# Patient Record
Sex: FEMALE | Race: WHITE | NOT HISPANIC OR LATINO | Employment: OTHER | URBAN - METROPOLITAN AREA
[De-identification: names, ages, dates, MRNs, and addresses within clinical notes are randomized per-mention and may not be internally consistent; named-entity substitution may affect disease eponyms.]

---

## 2023-07-12 LAB
ALANINE AMINOTRANSFERASE (SGPT) (U/L) IN SER/PLAS: 10 U/L (ref 7–45)
ALBUMIN (G/DL) IN SER/PLAS: 4.3 G/DL (ref 3.4–5)
ALKALINE PHOSPHATASE (U/L) IN SER/PLAS: 66 U/L (ref 33–136)
AMPHETAMINE (PRESENCE) IN URINE BY SCREEN METHOD: NORMAL
ANION GAP IN SER/PLAS: 15 MMOL/L (ref 10–20)
ASPARTATE AMINOTRANSFERASE (SGOT) (U/L) IN SER/PLAS: 15 U/L (ref 9–39)
BARBITURATES PRESENCE IN URINE BY SCREEN METHOD: NORMAL
BASOPHILS (10*3/UL) IN BLOOD BY AUTOMATED COUNT: 0.04 X10E9/L (ref 0–0.1)
BASOPHILS/100 LEUKOCYTES IN BLOOD BY AUTOMATED COUNT: 0.5 % (ref 0–2)
BENZODIAZEPINE (PRESENCE) IN URINE BY SCREEN METHOD: NORMAL
BILIRUBIN TOTAL (MG/DL) IN SER/PLAS: 0.3 MG/DL (ref 0–1.2)
CALCIUM (MG/DL) IN SER/PLAS: 9.2 MG/DL (ref 8.6–10.3)
CANNABINOIDS IN URINE BY SCREEN METHOD: NORMAL
CARBON DIOXIDE, TOTAL (MMOL/L) IN SER/PLAS: 26 MMOL/L (ref 21–32)
CHLORIDE (MMOL/L) IN SER/PLAS: 104 MMOL/L (ref 98–107)
COBALAMIN (VITAMIN B12) (PG/ML) IN SER/PLAS: 155 PG/ML (ref 211–911)
COCAINE (PRESENCE) IN URINE BY SCREEN METHOD: NORMAL
CREATININE (MG/DL) IN SER/PLAS: 1.38 MG/DL (ref 0.5–1.05)
DRUG SCREEN COMMENT URINE: NORMAL
EOSINOPHILS (10*3/UL) IN BLOOD BY AUTOMATED COUNT: 0.28 X10E9/L (ref 0–0.4)
EOSINOPHILS/100 LEUKOCYTES IN BLOOD BY AUTOMATED COUNT: 3.4 % (ref 0–6)
ERYTHROCYTE DISTRIBUTION WIDTH (RATIO) BY AUTOMATED COUNT: 17.2 % (ref 11.5–14.5)
ERYTHROCYTE MEAN CORPUSCULAR HEMOGLOBIN CONCENTRATION (G/DL) BY AUTOMATED: 29 G/DL (ref 32–36)
ERYTHROCYTE MEAN CORPUSCULAR VOLUME (FL) BY AUTOMATED COUNT: 81 FL (ref 80–100)
ERYTHROCYTES (10*6/UL) IN BLOOD BY AUTOMATED COUNT: 4.21 X10E12/L (ref 4–5.2)
FENTANYL URINE: NORMAL
FERRITIN (UG/LL) IN SER/PLAS: 11 UG/L (ref 8–150)
GFR FEMALE: 40 ML/MIN/1.73M2
GLUCOSE (MG/DL) IN SER/PLAS: 125 MG/DL (ref 74–99)
HEMATOCRIT (%) IN BLOOD BY AUTOMATED COUNT: 34.1 % (ref 36–46)
HEMOGLOBIN (G/DL) IN BLOOD: 9.9 G/DL (ref 12–16)
IMMATURE GRANULOCYTES/100 LEUKOCYTES IN BLOOD BY AUTOMATED COUNT: 0.6 % (ref 0–0.9)
IRON (UG/DL) IN SER/PLAS: 22 UG/DL (ref 35–150)
IRON BINDING CAPACITY (UG/DL) IN SER/PLAS: 462 UG/DL (ref 240–445)
IRON SATURATION (%) IN SER/PLAS: 5 % (ref 25–45)
LEUKOCYTES (10*3/UL) IN BLOOD BY AUTOMATED COUNT: 8.2 X10E9/L (ref 4.4–11.3)
LYMPHOCYTES (10*3/UL) IN BLOOD BY AUTOMATED COUNT: 1.32 X10E9/L (ref 0.8–3)
LYMPHOCYTES/100 LEUKOCYTES IN BLOOD BY AUTOMATED COUNT: 16.1 % (ref 13–44)
METHADONE (PRESENCE) IN URINE BY SCREEN METHOD: NORMAL
MONOCYTES (10*3/UL) IN BLOOD BY AUTOMATED COUNT: 0.46 X10E9/L (ref 0.05–0.8)
MONOCYTES/100 LEUKOCYTES IN BLOOD BY AUTOMATED COUNT: 5.6 % (ref 2–10)
NEUTROPHILS (10*3/UL) IN BLOOD BY AUTOMATED COUNT: 6.06 X10E9/L (ref 1.6–5.5)
NEUTROPHILS/100 LEUKOCYTES IN BLOOD BY AUTOMATED COUNT: 73.8 % (ref 40–80)
NRBC (PER 100 WBCS) BY AUTOMATED COUNT: 0 /100 WBC (ref 0–0)
OPIATES (PRESENCE) IN URINE BY SCREEN METHOD: NORMAL
OXYCODONE (PRESENCE) IN URINE BY SCREEN METHOD: NORMAL
PHENCYCLIDINE (PRESENCE) IN URINE BY SCREEN METHOD: NORMAL
PLATELETS (10*3/UL) IN BLOOD AUTOMATED COUNT: 378 X10E9/L (ref 150–450)
POTASSIUM (MMOL/L) IN SER/PLAS: 4.7 MMOL/L (ref 3.5–5.3)
PROTEIN TOTAL: 6.9 G/DL (ref 6.4–8.2)
SODIUM (MMOL/L) IN SER/PLAS: 140 MMOL/L (ref 136–145)
THYROTROPIN (MIU/L) IN SER/PLAS BY DETECTION LIMIT <= 0.05 MIU/L: 0.9 MIU/L (ref 0.44–3.98)
UREA NITROGEN (MG/DL) IN SER/PLAS: 14 MG/DL (ref 6–23)

## 2023-07-13 LAB
ESTIMATED AVERAGE GLUCOSE FOR HBA1C: 134 MG/DL
HEMOGLOBIN A1C/HEMOGLOBIN TOTAL IN BLOOD: 6.3 %

## 2023-07-19 ENCOUNTER — APPOINTMENT (OUTPATIENT)
Dept: PRIMARY CARE | Facility: CLINIC | Age: 73
End: 2023-07-19
Payer: MEDICARE

## 2023-08-01 LAB — URINE CULTURE: NORMAL

## 2023-08-24 ENCOUNTER — OFFICE VISIT (OUTPATIENT)
Dept: PRIMARY CARE | Facility: CLINIC | Age: 73
End: 2023-08-24
Payer: MEDICARE

## 2023-08-24 DIAGNOSIS — F11.90 OPIOID USE: ICD-10-CM

## 2023-08-24 DIAGNOSIS — E11.42 TYPE 2 DIABETES MELLITUS WITH POLYNEUROPATHY (MULTI): ICD-10-CM

## 2023-08-24 DIAGNOSIS — K26.9 DUODENAL ULCER: ICD-10-CM

## 2023-08-24 DIAGNOSIS — M17.0 PRIMARY OSTEOARTHRITIS OF BOTH KNEES: Primary | ICD-10-CM

## 2023-08-24 DIAGNOSIS — I10 PRIMARY HYPERTENSION: ICD-10-CM

## 2023-08-24 DIAGNOSIS — F03.A0 MILD DEMENTIA WITHOUT BEHAVIORAL DISTURBANCE, PSYCHOTIC DISTURBANCE, MOOD DISTURBANCE, OR ANXIETY, UNSPECIFIED DEMENTIA TYPE (MULTI): ICD-10-CM

## 2023-08-24 DIAGNOSIS — F41.9 ANXIETY: ICD-10-CM

## 2023-08-24 DIAGNOSIS — D50.0 IRON DEFICIENCY ANEMIA DUE TO CHRONIC BLOOD LOSS: ICD-10-CM

## 2023-08-24 PROBLEM — R51.9 HEADACHE: Status: ACTIVE | Noted: 2023-06-18

## 2023-08-24 PROBLEM — M20.42 HAMMER TOES, BILATERAL: Status: ACTIVE | Noted: 2017-11-27

## 2023-08-24 PROBLEM — K43.2 INCISIONAL HERNIA: Status: ACTIVE | Noted: 2023-08-24

## 2023-08-24 PROBLEM — E11.9 DIABETES MELLITUS (MULTI): Status: ACTIVE | Noted: 2023-06-18

## 2023-08-24 PROBLEM — M25.511 PAIN OF BOTH SHOULDER JOINTS: Status: ACTIVE | Noted: 2022-01-26

## 2023-08-24 PROBLEM — M79.675 PAIN IN TOES OF BOTH FEET: Status: ACTIVE | Noted: 2017-11-27

## 2023-08-24 PROBLEM — B35.1 ONYCHOMYCOSIS: Status: ACTIVE | Noted: 2017-11-27

## 2023-08-24 PROBLEM — D64.9 ANEMIA: Status: ACTIVE | Noted: 2023-06-18

## 2023-08-24 PROBLEM — M79.674 PAIN IN TOES OF BOTH FEET: Status: ACTIVE | Noted: 2017-11-27

## 2023-08-24 PROBLEM — M25.561 CHRONIC PAIN OF BOTH KNEES: Status: ACTIVE | Noted: 2022-01-26

## 2023-08-24 PROBLEM — C02.9 MALIGNANT NEOPLASM OF TONGUE (MULTI): Status: ACTIVE | Noted: 2023-06-18

## 2023-08-24 PROBLEM — E04.1 THYROID NODULE: Status: ACTIVE | Noted: 2023-08-24

## 2023-08-24 PROBLEM — G89.29 CHRONIC PAIN OF BOTH KNEES: Status: ACTIVE | Noted: 2022-01-26

## 2023-08-24 PROBLEM — K14.6 SORENESS OF TONGUE: Status: ACTIVE | Noted: 2023-08-24

## 2023-08-24 PROBLEM — M25.512 PAIN OF BOTH SHOULDER JOINTS: Status: ACTIVE | Noted: 2022-01-26

## 2023-08-24 PROBLEM — H60.02 ABSCESS OF LEFT EXTERNAL EAR: Status: ACTIVE | Noted: 2023-08-24

## 2023-08-24 PROBLEM — R26.89 LOSS OF BALANCE: Status: ACTIVE | Noted: 2023-08-24

## 2023-08-24 PROBLEM — H61.23 IMPACTED CERUMEN OF BOTH EARS: Status: ACTIVE | Noted: 2023-08-24

## 2023-08-24 PROBLEM — M79.672 PAIN IN LEFT FOOT: Status: ACTIVE | Noted: 2017-11-27

## 2023-08-24 PROBLEM — K92.1 MELENA: Status: ACTIVE | Noted: 2023-08-24

## 2023-08-24 PROBLEM — M77.32 CALCANEAL SPUR, LEFT: Status: ACTIVE | Noted: 2017-11-27

## 2023-08-24 PROBLEM — M20.41 HAMMER TOES, BILATERAL: Status: ACTIVE | Noted: 2017-11-27

## 2023-08-24 PROBLEM — M72.2 PLANTAR FASCIITIS: Status: ACTIVE | Noted: 2017-11-27

## 2023-08-24 PROBLEM — H91.90 HEARING DIFFICULTY: Status: ACTIVE | Noted: 2023-06-18

## 2023-08-24 PROBLEM — I89.0 LYMPHEDEMA: Status: ACTIVE | Noted: 2022-12-07

## 2023-08-24 PROBLEM — R47.1 DYSARTHRIA ON EXAMINATION: Status: ACTIVE | Noted: 2023-08-24

## 2023-08-24 PROBLEM — M47.812 CERVICAL SPONDYLOSIS WITHOUT MYELOPATHY: Status: ACTIVE | Noted: 2022-01-26

## 2023-08-24 PROBLEM — M25.562 CHRONIC PAIN OF BOTH KNEES: Status: ACTIVE | Noted: 2022-01-26

## 2023-08-24 PROBLEM — R92.8 ABNORMAL MAMMOGRAM OF LEFT BREAST: Status: ACTIVE | Noted: 2023-08-24

## 2023-08-24 PROCEDURE — 1159F MED LIST DOCD IN RCRD: CPT | Performed by: INTERNAL MEDICINE

## 2023-08-24 PROCEDURE — 99204 OFFICE O/P NEW MOD 45 MIN: CPT | Performed by: INTERNAL MEDICINE

## 2023-08-24 PROCEDURE — 3044F HG A1C LEVEL LT 7.0%: CPT | Performed by: INTERNAL MEDICINE

## 2023-08-24 PROCEDURE — 3008F BODY MASS INDEX DOCD: CPT | Performed by: INTERNAL MEDICINE

## 2023-08-24 PROCEDURE — 1125F AMNT PAIN NOTED PAIN PRSNT: CPT | Performed by: INTERNAL MEDICINE

## 2023-08-24 PROCEDURE — 4010F ACE/ARB THERAPY RXD/TAKEN: CPT | Performed by: INTERNAL MEDICINE

## 2023-08-24 RX ORDER — PANTOPRAZOLE SODIUM 40 MG/1
40 TABLET, DELAYED RELEASE ORAL
COMMUNITY
Start: 2022-11-26 | End: 2023-10-30 | Stop reason: SDUPTHER

## 2023-08-24 RX ORDER — CLOTRIMAZOLE AND BETAMETHASONE DIPROPIONATE 10; .64 MG/G; MG/G
1 CREAM TOPICAL 2 TIMES DAILY
COMMUNITY

## 2023-08-24 RX ORDER — MEMANTINE HYDROCHLORIDE 10 MG/1
10 TABLET ORAL 2 TIMES DAILY
COMMUNITY
Start: 2022-11-26 | End: 2024-05-18 | Stop reason: SDUPTHER

## 2023-08-24 RX ORDER — CLOBETASOL PROPIONATE 0.5 MG/G
1 OINTMENT TOPICAL 2 TIMES DAILY
COMMUNITY
Start: 2023-03-22 | End: 2023-10-30 | Stop reason: ALTCHOICE

## 2023-08-24 RX ORDER — TRAMADOL HYDROCHLORIDE 50 MG/1
100 TABLET ORAL 2 TIMES DAILY
COMMUNITY
End: 2023-10-31

## 2023-08-24 RX ORDER — ERGOCALCIFEROL 1.25 MG/1
50000 CAPSULE ORAL
COMMUNITY
Start: 2023-08-14

## 2023-08-24 RX ORDER — SENNOSIDES 8.6 MG/1
2 TABLET ORAL DAILY
COMMUNITY
Start: 2023-04-10 | End: 2023-10-30

## 2023-08-24 RX ORDER — FUROSEMIDE 20 MG/1
1 TABLET ORAL DAILY PRN
COMMUNITY
Start: 2017-07-20

## 2023-08-24 RX ORDER — GLYBURIDE-METFORMIN HYDROCHLORIDE 2.5; 5 MG/1; MG/1
2 TABLET ORAL 2 TIMES DAILY
COMMUNITY
End: 2023-10-24 | Stop reason: SDUPTHER

## 2023-08-24 RX ORDER — LISINOPRIL 10 MG/1
10 TABLET ORAL DAILY
COMMUNITY
End: 2023-11-30

## 2023-08-30 PROBLEM — F03.A0 MILD DEMENTIA WITHOUT BEHAVIORAL DISTURBANCE, PSYCHOTIC DISTURBANCE, MOOD DISTURBANCE, OR ANXIETY, UNSPECIFIED DEMENTIA TYPE (MULTI): Status: ACTIVE | Noted: 2023-08-30

## 2023-08-30 NOTE — PROGRESS NOTES
Subjective   Abigail Flores is a 73 y.o. female who presents for New Patient Visit (NPV is here to establish care).  Patient presents to establish care.  She was previously seeing Dr. Nieves.  Patient follows with Dr. Avendano for anemia.  She gets iron infusions.  She has a history of bleeding ulcers.  Ferritin previously undetectable.  Patient needs an EGD and colonoscopy.  She is worried about the bowel prep.  She is following with GI.  Patient follows with pain management for opioids.        Objective     There were no vitals taken for this visit.     Physical Exam  Constitutional:       Appearance: Normal appearance.   HENT:      Head: Normocephalic and atraumatic.      Nose: Nose normal.      Mouth/Throat:      Mouth: Mucous membranes are moist.      Pharynx: Oropharynx is clear.   Eyes:      Extraocular Movements: Extraocular movements intact.      Pupils: Pupils are equal, round, and reactive to light.   Cardiovascular:      Rate and Rhythm: Normal rate and regular rhythm.   Pulmonary:      Effort: No respiratory distress.      Breath sounds: Normal breath sounds. No wheezing, rhonchi or rales.   Abdominal:      General: Bowel sounds are normal. There is no distension.      Palpations: Abdomen is soft.      Tenderness: There is no abdominal tenderness. There is no guarding.   Musculoskeletal:      Right lower leg: No edema.      Left lower leg: No edema.   Skin:     General: Skin is warm and dry.   Neurological:      Mental Status: She is alert and oriented to person, place, and time. Mental status is at baseline.   Psychiatric:         Mood and Affect: Mood normal.         Behavior: Behavior normal.         Assessment/Plan   Problem List Items Addressed This Visit       Anemia    Relevant Orders    CBC    Comprehensive Metabolic Panel    Anxiety    Type 2 diabetes mellitus with polyneuropathy (CMS/HCC)    Duodenal ulcer    Hypertension    Opioid use    Mild dementia without behavioral disturbance, psychotic  disturbance, mood disturbance, or anxiety, unspecified dementia type (CMS/[facility])     Other Visit Diagnoses       Primary osteoarthritis of both knees    -  Primary    Relevant Orders    Referral to Pain Medicine          Continue medications as previously prescribed  Maintain follow-up with hematology  Maintain follow-up with GI for colonoscopy, patient offered admission for bowel prep if needed  Maintain follow-up with pain management for chronic opioids  Referral to orthopedics for osteoarthritis  Follow-up in 6 months for CBC and CMP       Barron Villeda DO

## 2023-10-04 ENCOUNTER — OFFICE VISIT (OUTPATIENT)
Dept: WOUND CARE | Facility: CLINIC | Age: 73
End: 2023-10-04
Payer: MEDICARE

## 2023-10-04 PROCEDURE — G0463 HOSPITAL OUTPT CLINIC VISIT: HCPCS

## 2023-10-04 PROCEDURE — 99213 OFFICE O/P EST LOW 20 MIN: CPT

## 2023-10-12 ENCOUNTER — CLINICAL SUPPORT (OUTPATIENT)
Dept: WOUND CARE | Facility: CLINIC | Age: 73
End: 2023-10-12
Payer: MEDICARE

## 2023-10-12 PROCEDURE — 99212 OFFICE O/P EST SF 10 MIN: CPT

## 2023-10-12 PROCEDURE — 99213 OFFICE O/P EST LOW 20 MIN: CPT | Performed by: NURSE PRACTITIONER

## 2023-10-13 ENCOUNTER — TELEPHONE (OUTPATIENT)
Dept: PRIMARY CARE | Facility: CLINIC | Age: 73
End: 2023-10-13
Payer: MEDICARE

## 2023-10-13 RX ORDER — CLOTRIMAZOLE AND BETAMETHASONE DIPROPIONATE 10; .64 MG/G; MG/G
1 CREAM TOPICAL 2 TIMES DAILY
Qty: 60 G | Refills: 0 | Status: CANCELLED | OUTPATIENT
Start: 2023-10-13

## 2023-10-17 ENCOUNTER — TELEPHONE (OUTPATIENT)
Dept: PRIMARY CARE | Facility: CLINIC | Age: 73
End: 2023-10-17
Payer: MEDICARE

## 2023-10-23 NOTE — TELEPHONE ENCOUNTER
Patient prescription refill    Glyburide- metformin    Tramadol  Clotrimazole- betamenasone cream     90 day refill

## 2023-10-24 DIAGNOSIS — E11.42 TYPE 2 DIABETES MELLITUS WITH POLYNEUROPATHY (MULTI): Primary | ICD-10-CM

## 2023-10-24 RX ORDER — GLYBURIDE-METFORMIN HYDROCHLORIDE 2.5; 5 MG/1; MG/1
2 TABLET ORAL 2 TIMES DAILY
Qty: 360 TABLET | Refills: 3 | Status: SHIPPED | OUTPATIENT
Start: 2023-10-24 | End: 2024-03-01 | Stop reason: SDUPTHER

## 2023-10-28 PROBLEM — K59.09 CHRONIC CONSTIPATION: Status: ACTIVE | Noted: 2023-10-28

## 2023-10-28 PROBLEM — R40.0 DAYTIME SLEEPINESS: Status: ACTIVE | Noted: 2023-10-28

## 2023-10-28 PROBLEM — E11.42 TYPE 2 DIABETES MELLITUS WITH POLYNEUROPATHY (MULTI): Status: ACTIVE | Noted: 2017-11-27

## 2023-10-28 PROBLEM — M19.90 ARTHRITIS: Status: ACTIVE | Noted: 2023-10-28

## 2023-10-28 RX ORDER — KETOCONAZOLE 20 MG/G
1 CREAM TOPICAL
COMMUNITY
Start: 2018-11-30

## 2023-10-28 RX ORDER — LIDOCAINE HYDROCHLORIDE 20 MG/ML
0.1 JELLY TOPICAL 3 TIMES DAILY PRN
COMMUNITY
Start: 2015-05-13

## 2023-10-28 RX ORDER — TRIAMCINOLONE ACETONIDE 1 MG/G
PASTE DENTAL 3 TIMES DAILY
COMMUNITY
Start: 2020-10-09

## 2023-10-28 RX ORDER — DEXAMETHASONE SODIUM PHOSPHATE 4 MG/ML
INJECTION, SOLUTION INTRA-ARTICULAR; INTRALESIONAL; INTRAMUSCULAR; INTRAVENOUS; SOFT TISSUE
COMMUNITY
Start: 2018-01-10 | End: 2023-10-30 | Stop reason: ALTCHOICE

## 2023-10-28 RX ORDER — METRONIDAZOLE 7.5 MG/G
CREAM TOPICAL
COMMUNITY
Start: 2022-11-28

## 2023-10-28 RX ORDER — DESONIDE 0.5 MG/G
CREAM TOPICAL
COMMUNITY
Start: 2022-11-28

## 2023-10-28 RX ORDER — HYDROCODONE BITARTRATE AND IBUPROFEN 7.5; 2 MG/1; MG/1
1 TABLET, FILM COATED ORAL EVERY 6 HOURS PRN
COMMUNITY
End: 2023-10-30 | Stop reason: ALTCHOICE

## 2023-10-28 RX ORDER — LIDOCAINE 30 MG/G
CREAM TOPICAL
COMMUNITY

## 2023-10-28 RX ORDER — ZINC GLUCONATE 50 MG
1000 TABLET ORAL DAILY
COMMUNITY
Start: 2023-08-23

## 2023-10-28 RX ORDER — ACETAMINOPHEN 325 MG/1
650 TABLET ORAL EVERY 4 HOURS PRN
COMMUNITY
Start: 2021-06-18

## 2023-10-28 RX ORDER — CICLOPIROX 80 MG/ML
SOLUTION TOPICAL NIGHTLY
COMMUNITY
Start: 2022-11-29

## 2023-10-28 RX ORDER — KETOCONAZOLE 20 MG/ML
SHAMPOO, SUSPENSION TOPICAL
COMMUNITY
Start: 2022-11-28

## 2023-10-28 RX ORDER — GLIPIZIDE AND METFORMIN HCL 2.5; 25 MG/1; MG/1
1 TABLET, FILM COATED ORAL
COMMUNITY
End: 2024-01-30

## 2023-10-28 RX ORDER — MELOXICAM 15 MG/1
1 TABLET ORAL DAILY
COMMUNITY
Start: 2018-01-02

## 2023-10-28 RX ORDER — ASPIRIN 81 MG/1
81 TABLET ORAL
COMMUNITY
Start: 2015-05-13 | End: 2023-10-30 | Stop reason: ALTCHOICE

## 2023-10-28 RX ORDER — ALPRAZOLAM 0.25 MG/1
0.25 TABLET ORAL AS NEEDED
COMMUNITY
Start: 2015-05-13 | End: 2023-10-30 | Stop reason: ALTCHOICE

## 2023-10-28 RX ORDER — NYSTATIN 100000 [USP'U]/G
POWDER TOPICAL
COMMUNITY
Start: 2022-11-28

## 2023-10-28 RX ORDER — BISACODYL 10 MG/1
10 SUPPOSITORY RECTAL ONCE
COMMUNITY
Start: 2021-06-18 | End: 2023-10-30 | Stop reason: ALTCHOICE

## 2023-10-28 RX ORDER — PANTOPRAZOLE SODIUM 40 MG/1
1 TABLET, DELAYED RELEASE ORAL 2 TIMES DAILY
COMMUNITY
Start: 2021-06-18 | End: 2024-03-04

## 2023-10-28 RX ORDER — DOCUSATE SODIUM 100 MG/1
100 CAPSULE, LIQUID FILLED ORAL 2 TIMES DAILY
COMMUNITY
Start: 2021-06-18

## 2023-10-28 RX ORDER — OMEPRAZOLE 40 MG/1
40 CAPSULE, DELAYED RELEASE ORAL DAILY
COMMUNITY
Start: 2023-04-10 | End: 2023-10-30 | Stop reason: ALTCHOICE

## 2023-10-30 ENCOUNTER — OFFICE VISIT (OUTPATIENT)
Dept: PRIMARY CARE | Facility: CLINIC | Age: 73
End: 2023-10-30
Payer: MEDICARE

## 2023-10-30 VITALS
TEMPERATURE: 97.8 F | WEIGHT: 184.8 LBS | BODY MASS INDEX: 31.55 KG/M2 | SYSTOLIC BLOOD PRESSURE: 143 MMHG | OXYGEN SATURATION: 97 % | RESPIRATION RATE: 16 BRPM | HEIGHT: 64 IN | HEART RATE: 90 BPM | DIASTOLIC BLOOD PRESSURE: 67 MMHG

## 2023-10-30 DIAGNOSIS — M25.562 CHRONIC PAIN OF BOTH KNEES: ICD-10-CM

## 2023-10-30 DIAGNOSIS — B37.9 YEAST INFECTION: Primary | ICD-10-CM

## 2023-10-30 DIAGNOSIS — M25.561 CHRONIC PAIN OF BOTH KNEES: ICD-10-CM

## 2023-10-30 DIAGNOSIS — G89.29 CHRONIC PAIN OF BOTH KNEES: ICD-10-CM

## 2023-10-30 DIAGNOSIS — Z79.899 HIGH RISK MEDICATION USE: ICD-10-CM

## 2023-10-30 PROCEDURE — 4010F ACE/ARB THERAPY RXD/TAKEN: CPT | Performed by: NURSE PRACTITIONER

## 2023-10-30 PROCEDURE — 3077F SYST BP >= 140 MM HG: CPT | Performed by: NURSE PRACTITIONER

## 2023-10-30 PROCEDURE — 3008F BODY MASS INDEX DOCD: CPT | Performed by: NURSE PRACTITIONER

## 2023-10-30 PROCEDURE — 3044F HG A1C LEVEL LT 7.0%: CPT | Performed by: NURSE PRACTITIONER

## 2023-10-30 PROCEDURE — 99213 OFFICE O/P EST LOW 20 MIN: CPT | Performed by: NURSE PRACTITIONER

## 2023-10-30 PROCEDURE — 1160F RVW MEDS BY RX/DR IN RCRD: CPT | Performed by: NURSE PRACTITIONER

## 2023-10-30 PROCEDURE — 99214 OFFICE O/P EST MOD 30 MIN: CPT | Mod: ZK | Performed by: NURSE PRACTITIONER

## 2023-10-30 PROCEDURE — 1036F TOBACCO NON-USER: CPT | Performed by: NURSE PRACTITIONER

## 2023-10-30 PROCEDURE — 1159F MED LIST DOCD IN RCRD: CPT | Performed by: NURSE PRACTITIONER

## 2023-10-30 PROCEDURE — 3078F DIAST BP <80 MM HG: CPT | Performed by: NURSE PRACTITIONER

## 2023-10-30 PROCEDURE — 1125F AMNT PAIN NOTED PAIN PRSNT: CPT | Performed by: NURSE PRACTITIONER

## 2023-10-30 SDOH — ECONOMIC STABILITY: FOOD INSECURITY: WITHIN THE PAST 12 MONTHS, YOU WORRIED THAT YOUR FOOD WOULD RUN OUT BEFORE YOU GOT MONEY TO BUY MORE.: NEVER TRUE

## 2023-10-30 SDOH — ECONOMIC STABILITY: FOOD INSECURITY: WITHIN THE PAST 12 MONTHS, THE FOOD YOU BOUGHT JUST DIDN'T LAST AND YOU DIDN'T HAVE MONEY TO GET MORE.: NEVER TRUE

## 2023-10-30 ASSESSMENT — COLUMBIA-SUICIDE SEVERITY RATING SCALE - C-SSRS
6. HAVE YOU EVER DONE ANYTHING, STARTED TO DO ANYTHING, OR PREPARED TO DO ANYTHING TO END YOUR LIFE?: NO
1. IN THE PAST MONTH, HAVE YOU WISHED YOU WERE DEAD OR WISHED YOU COULD GO TO SLEEP AND NOT WAKE UP?: NO
2. HAVE YOU ACTUALLY HAD ANY THOUGHTS OF KILLING YOURSELF?: NO

## 2023-10-30 ASSESSMENT — ENCOUNTER SYMPTOMS
OCCASIONAL FEELINGS OF UNSTEADINESS: 1
LOSS OF SENSATION IN FEET: 0
DEPRESSION: 0

## 2023-10-30 ASSESSMENT — PATIENT HEALTH QUESTIONNAIRE - PHQ9
1. LITTLE INTEREST OR PLEASURE IN DOING THINGS: NOT AT ALL
2. FEELING DOWN, DEPRESSED OR HOPELESS: NOT AT ALL
SUM OF ALL RESPONSES TO PHQ9 QUESTIONS 1 AND 2: 0

## 2023-10-30 ASSESSMENT — PAIN SCALES - GENERAL: PAINLEVEL: 5

## 2023-10-31 RX ORDER — NYSTATIN AND TRIAMCINOLONE ACETONIDE 100000; 1 [USP'U]/G; MG/G
CREAM TOPICAL 2 TIMES DAILY
Qty: 60 G | Refills: 3 | Status: SHIPPED | OUTPATIENT
Start: 2023-10-31

## 2023-10-31 NOTE — PROGRESS NOTES
Subjective   Patient ID: Abigail Flores is a 73 y.o. female who presents for Rash (Rash to abdominal folds).  HPI 73-year-old female presents today for medication for rash under bilateral breast contours.  Patient states this is a chronic condition.  She states it worsens in the summertime.  Rash opens and crusts.  She has been using Neosporin and steroid cream.  She also notes small 1 cm circular area to the right buttocks which she is applying Neosporin cream.    Patient is in a rush today due to taking a bus and has another appointment - podiatry.  She has limited transportation.  Patient has chronic bilateral knee pain.  She has had injections in the past.  She was referred to pain management and unable to attend her appointment.  Long discussion with patient regarding her pain management.  Patient has been on tramadol at this dose for several years.      Patient advised of the addictive potential of medication.  Advised in safe storage and use.  Patient verbalizes understanding. OARRS appropriate.   Refill provided.      OARRS:  No data recorded  I have personally reviewed the OARRS report for Abigail Flores. I have considered the risks of abuse, dependence, addiction and diversion, I believe that it is clinically appropriate for Abigail Flores to be prescribed this medication, and I have the following concerns   Long term use of medication.   I have referred her to Pain Management.      Is the patient prescribed a combination of a benzodiazepine and opioid?  No    Last Urine Drug Screen / ordered today: No  No results found for this or any previous visit (from the past 8760 hour(s)).  Results are as expected.  7/12/2023    Controlled Substance Agreement:  Date of the Last Agreement: 7/12/2023  Reviewed Controlled Substance Agreement including but not limited to the benefits, risks, and alternatives to treatment with a Controlled Substance medication(s).  Muscle relaxer  What is the patient's goal of therapy? Ability to  "walk - bilateral arthritis to knees.   Is this being achieved with current treatment? yes  Is the patient currently prescribed an opioid, and/or benzodiazepine? No    Activities of Daily Living:   Is your overall impression that this patient is benefiting (symptom reduction outweighs side effects) from [facility] therapy? Yes     1. Physical Functioning: Same  2. Family Relationship: Same  3. Social Relationship: Same  4. Mood: Same  5. Sleep Patterns: Same  6. Overall Function: Same     I did explain to patient that she will need to follow up with pain management for further refills of this medication or per their recommendations.  I will bridge medication until she is able to get an appointment with pain management.   She verbalizes understanding.       Review of Systems  Review of systems: Present-feeling well. Not present-chills, fatigue and fever.  Skin: Rash to bilateral contours of breasts.  HEENT: Not present-headache, ear pain, nasal congestion, and sore throat.  Neck: Not present-neck pain, neck stiffness and swollen glands.  Respiratory: Not present-difficulty breathing, cough, bloody sputum.  Cardiovascular: Not present-abnormal blood pressure, chest pain, palpitations.  Gastrointestinal: Not present-abdominal pain, bloody or very black stools, heartburn, nausea and vomiting.  Genitourinary: Not present-change in bladder habits, hematuria, flank pain and dysuria.  Musculoskeletal: Chronic bilateral knee pain.    Neurological: Not present-dizziness, headache.  Psychiatric: Not present-suicidal ideation, suicidal planning, thoughts of hurting others and thoughts of self-harm.    Objective   /67 (BP Location: Right arm, Patient Position: Sitting, BP Cuff Size: Adult)   Pulse 90   Temp 36.6 °C (97.8 °F) (Temporal)   Resp 16   Ht 1.626 m (5' 4\")   Wt 83.8 kg (184 lb 12.8 oz)   SpO2 97%   BMI 31.72 kg/m²      Physical Exam  Gen.: Mental status-alert. Gen. appearance-cooperative, well groomed and " consistent with stated age. Not in acute distress or sickly. Orientation-oriented to time, place, purpose and person. Build and nutrition-well-nourished and well-developed. Hydration-well-hydrated.    Integumentary: Rash to bilateral contours of breasts.  Yeast appearing with no open wounds or purulent drainage appreciated.  Scabbed areas noted.       Head and neck: Head-normocephalic, atraumatic with no lesions or palpable masses. Face-atraumatic. Neck-full range of motion and subtle. No lymphadenopathy and no nuchal rigidity.     Chest and lung exam: Auscultation-normal breath sounds, no adventitious lung sounds and normal vocal resonance. Chest wall is normal in shape and non-tender.    Cardiovascular: Auscultation: Regular rate and rhythm. Heart sounds-normal heart sounds, S1-S2. No murmurs or gallops appreciated. Carotid arteries normal and without bruit.      Neurologic: Mental [id number]. Cranial nerves: Cranial nerves II through XII grossly intact. Front loaded.    Musculoskeletal: Examination of the spine with no step-offs or point tenderness.  Full range of motion of the spine without pain or difficulty.   Chronic bilateral knee pain - arthritis.     Assessment/Plan   Diagnoses and all orders for this visit:  Yeast infection  -     nystatin-triamcinolone (Mycolog II) cream; Apply topically 2 times a day. Apply to affect area twice a day for 7-14 days then as needed for rash.     Chronic bilateral knee pain.   Patient again referred to Pain Management.   No further refills.

## 2023-11-01 ENCOUNTER — APPOINTMENT (OUTPATIENT)
Dept: OTOLARYNGOLOGY | Facility: CLINIC | Age: 73
End: 2023-11-01
Payer: MEDICARE

## 2023-11-01 PROBLEM — B37.9 YEAST INFECTION: Status: ACTIVE | Noted: 2023-11-01

## 2023-11-01 PROBLEM — Z79.899 HIGH RISK MEDICATION USE: Status: ACTIVE | Noted: 2023-11-01

## 2023-11-01 NOTE — PATIENT INSTRUCTIONS
Chronic bilateral knee pain treated for several years with tramadol.   Long discussion with patient re: tramadol use including risks, benefits and side effects.   You have been given a refill of your medication with precautions.   You have been advised that this refill is meant only to bridge you to you are able to get in with Pain Management.  We did discuss the addictive potential of this medication.  You have verbalized understanding and accept risk of use.      Yeast infection to contours of bilateral breasts.  You were given a prescription for nystatin cream to be used as directed.  You are asked to contact the office with worsening condition or no resolve over the next few weeks.  You may continue to use Neosporin cream to right buttock excoriation.   You are to contact office with worsening condition.     Follow up in 2 weeks or sooner as needed.   Again you do have a referral to pain management.   Please schedule appointment.

## 2023-11-03 ENCOUNTER — OFFICE VISIT (OUTPATIENT)
Dept: URGENT CARE | Facility: CLINIC | Age: 73
End: 2023-11-03
Payer: MEDICARE

## 2023-11-03 VITALS
RESPIRATION RATE: 18 BRPM | SYSTOLIC BLOOD PRESSURE: 145 MMHG | HEART RATE: 77 BPM | TEMPERATURE: 97.5 F | DIASTOLIC BLOOD PRESSURE: 86 MMHG

## 2023-11-03 DIAGNOSIS — L03.116 CELLULITIS OF LEFT LOWER EXTREMITY: Primary | ICD-10-CM

## 2023-11-03 PROCEDURE — 1036F TOBACCO NON-USER: CPT | Performed by: PHYSICIAN ASSISTANT

## 2023-11-03 PROCEDURE — 1159F MED LIST DOCD IN RCRD: CPT | Performed by: PHYSICIAN ASSISTANT

## 2023-11-03 PROCEDURE — 99204 OFFICE O/P NEW MOD 45 MIN: CPT | Performed by: PHYSICIAN ASSISTANT

## 2023-11-03 PROCEDURE — 3044F HG A1C LEVEL LT 7.0%: CPT | Performed by: PHYSICIAN ASSISTANT

## 2023-11-03 PROCEDURE — 4010F ACE/ARB THERAPY RXD/TAKEN: CPT | Performed by: PHYSICIAN ASSISTANT

## 2023-11-03 PROCEDURE — 3077F SYST BP >= 140 MM HG: CPT | Performed by: PHYSICIAN ASSISTANT

## 2023-11-03 PROCEDURE — 3008F BODY MASS INDEX DOCD: CPT | Performed by: PHYSICIAN ASSISTANT

## 2023-11-03 PROCEDURE — 1160F RVW MEDS BY RX/DR IN RCRD: CPT | Performed by: PHYSICIAN ASSISTANT

## 2023-11-03 PROCEDURE — 3079F DIAST BP 80-89 MM HG: CPT | Performed by: PHYSICIAN ASSISTANT

## 2023-11-03 PROCEDURE — 1125F AMNT PAIN NOTED PAIN PRSNT: CPT | Performed by: PHYSICIAN ASSISTANT

## 2023-11-03 RX ORDER — CEPHALEXIN 500 MG/1
500 CAPSULE ORAL 4 TIMES DAILY
Qty: 28 CAPSULE | Refills: 0 | Status: SHIPPED | OUTPATIENT
Start: 2023-11-03 | End: 2023-11-10

## 2023-11-03 ASSESSMENT — ENCOUNTER SYMPTOMS
PSYCHIATRIC NEGATIVE: 1
EYE DISCHARGE: 0
EYE REDNESS: 0
COUGH: 0
ALLERGIC/IMMUNOLOGIC NEGATIVE: 1
SINUS PRESSURE: 0
DIARRHEA: 0
FEVER: 0
COLOR CHANGE: 1
DYSURIA: 0
SHORTNESS OF BREATH: 0
FLANK PAIN: 0
SORE THROAT: 0
FATIGUE: 0
BACK PAIN: 0
WHEEZING: 0
ACTIVITY CHANGE: 0
NEUROLOGICAL NEGATIVE: 1
EYE PAIN: 0
VOMITING: 0
HEMATOLOGIC/LYMPHATIC NEGATIVE: 1
NAUSEA: 0
WOUND: 1
APPETITE CHANGE: 0
BLOOD IN STOOL: 0
ENDOCRINE NEGATIVE: 1
RHINORRHEA: 0
ARTHRALGIAS: 0
ABDOMINAL PAIN: 0

## 2023-11-03 ASSESSMENT — PAIN SCALES - GENERAL: PAINLEVEL: 1

## 2023-11-03 NOTE — PROGRESS NOTES
Subjective   Patient ID: Abigail Flores is a 73 y.o. female who presents for Leg Swelling (Pt sts her lymphedema started to drain today).  Patient deals with chronic lymphedema of bilateral lower extremities.  She was changing her socks yesterday and accidentally suffered a skin tear to the medial aspect of the left lower extremity, and she has had drainage from the site since.  Drainage has been clear but the area has become somewhat erythematous.  She denies any fevers or chills.  Denies any significant pain.  She is unsure how to get the draining to reduce.  She does not wear compression stockings but notes that she has them at home.    Past Medical History:   Diagnosis Date    Personal history of diseases of the blood and blood-forming organs and certain disorders involving the immune mechanism     History of anemia    Personal history of malignant neoplasm, unspecified     History of malignant neoplasm    Personal history of other diseases of the circulatory system     History of hypertension    Personal history of other diseases of the musculoskeletal system and connective tissue     History of arthritis    Personal history of other diseases of the nervous system and sense organs     History of cataract    Personal history of other diseases of the respiratory system     History of bronchitis    Personal history of other diseases of urinary system     History of bladder problems    Personal history of other endocrine, nutritional and metabolic disease     History of diabetes mellitus    Personal history of other endocrine, nutritional and metabolic disease     History of thyroid disorder    Personal history of other mental and behavioral disorders     History of depression    Personal history of other specified conditions     History of blood loss    Shortness of breath     SOB (shortness of breath) on exertion    Snoring     Snoring    Unspecified abdominal hernia without obstruction or gangrene     Hernia        Review of Systems   Constitutional:  Negative for activity change, appetite change, fatigue and fever.   HENT:  Negative for congestion, rhinorrhea, sinus pressure and sore throat.    Eyes:  Negative for pain, discharge and redness.   Respiratory:  Negative for cough, shortness of breath and wheezing.    Cardiovascular:  Positive for leg swelling. Negative for chest pain.   Gastrointestinal:  Negative for abdominal pain, blood in stool, diarrhea, nausea and vomiting.   Endocrine: Negative.    Genitourinary:  Negative for dysuria and flank pain.   Musculoskeletal:  Negative for arthralgias, back pain and gait problem.   Skin:  Positive for color change and wound. Negative for rash.   Allergic/Immunologic: Negative.    Neurological: Negative.    Hematological: Negative.    Psychiatric/Behavioral: Negative.         Objective   /86   Pulse 77   Temp 36.4 °C (97.5 °F)   Resp 18   Physical Exam  Constitutional:       General: She is not in acute distress.     Appearance: Normal appearance. She is not ill-appearing, toxic-appearing or diaphoretic.   HENT:      Head: Normocephalic and atraumatic.      Mouth/Throat:      Mouth: Mucous membranes are moist.      Pharynx: Oropharynx is clear.   Eyes:      Conjunctiva/sclera: Conjunctivae normal.   Cardiovascular:      Rate and Rhythm: Normal rate and regular rhythm.      Heart sounds: No murmur heard.  Pulmonary:      Effort: Pulmonary effort is normal. No respiratory distress.      Breath sounds: Normal breath sounds. No wheezing.   Musculoskeletal:         General: Normal range of motion.      Cervical back: Normal range of motion and neck supple.   Skin:     General: Skin is warm and dry.      Findings: Erythema present. No rash.      Comments: Bilateral lower extremities edematous.  There is a small skin tear to the medial aspect of the mid lower left extremity weeping serous fluid.  There is some surrounding erythema.  The patient has wrapped the legs and  gauze and this is soaked through with serous fluid.  No purulence.   Neurological:      General: No focal deficit present.      Mental Status: She is alert and oriented to person, place, and time.      Gait: Gait normal.         Assessment/Plan   Problem List Items Addressed This Visit       Cellulitis of left lower extremity - Primary    Relevant Medications    cephalexin (Keflex) 500 mg capsule   -I discussed the importance of wearing the compression stockings with the patient for her underlying lymphedema.  Discussed how to get them on as this seems to be a barrier for the patient.  -Otherwise the skin tear does seem to be showing some early stages of cellulitis or localized soft tissue infection.  No purulence no significant heat emanating from the area.  I am opting to treat the patient with Keflex for coverage given her underlying health status and likelihood that this will continue to get worse as I suspect this is early cellulitis  -Otherwise the wound was dressed here and the wraps on the legs were changed.  The wound was cleaned and approximated using Steri-Strips.  Tegaderm placed over the wound.  Recommending she change the dressing in 24 hours.

## 2023-11-15 ENCOUNTER — OFFICE VISIT (OUTPATIENT)
Dept: WOUND CARE | Facility: CLINIC | Age: 73
End: 2023-11-15
Payer: MEDICARE

## 2023-11-15 PROCEDURE — 29581 APPL MULTLAYER CMPRN SYS LEG: CPT | Mod: LT

## 2023-11-15 PROCEDURE — 99213 OFFICE O/P EST LOW 20 MIN: CPT | Mod: 25

## 2023-11-20 ENCOUNTER — CLINICAL SUPPORT (OUTPATIENT)
Dept: WOUND CARE | Facility: CLINIC | Age: 73
End: 2023-11-20
Payer: MEDICARE

## 2023-11-20 ENCOUNTER — TELEPHONE (OUTPATIENT)
Dept: OTOLARYNGOLOGY | Facility: HOSPITAL | Age: 73
End: 2023-11-20
Payer: MEDICARE

## 2023-11-20 PROCEDURE — 29581 APPL MULTLAYER CMPRN SYS LEG: CPT | Mod: LT

## 2023-11-20 NOTE — TELEPHONE ENCOUNTER
Patient would like a call back regarding some sores on her tongue would like to know if something can be called in for her

## 2023-11-20 NOTE — TELEPHONE ENCOUNTER
Spoke to patient. She is aware she has not been seen in 3 years, so cannot order any medication.  I offered patient an appt next Tuesday, but she is unable to get a ride at the time I offered.  I have moved appt to next Friday.

## 2023-11-22 ENCOUNTER — CLINICAL SUPPORT (OUTPATIENT)
Dept: WOUND CARE | Facility: CLINIC | Age: 73
End: 2023-11-22
Payer: MEDICARE

## 2023-11-22 PROCEDURE — 29581 APPL MULTLAYER CMPRN SYS LEG: CPT | Mod: LT

## 2023-11-27 ENCOUNTER — CLINICAL SUPPORT (OUTPATIENT)
Dept: WOUND CARE | Facility: CLINIC | Age: 73
End: 2023-11-27
Payer: MEDICARE

## 2023-11-27 DIAGNOSIS — E53.8 VITAMIN B12 DEFICIENCY: ICD-10-CM

## 2023-11-27 DIAGNOSIS — D50.0 IRON DEFICIENCY ANEMIA DUE TO CHRONIC BLOOD LOSS: Primary | ICD-10-CM

## 2023-11-27 PROCEDURE — 29581 APPL MULTLAYER CMPRN SYS LEG: CPT

## 2023-11-29 ENCOUNTER — OFFICE VISIT (OUTPATIENT)
Dept: WOUND CARE | Facility: CLINIC | Age: 73
End: 2023-11-29
Payer: MEDICARE

## 2023-11-29 PROCEDURE — 99213 OFFICE O/P EST LOW 20 MIN: CPT | Mod: 25

## 2023-12-01 ENCOUNTER — APPOINTMENT (OUTPATIENT)
Dept: OTOLARYNGOLOGY | Facility: CLINIC | Age: 73
End: 2023-12-01
Payer: MEDICARE

## 2023-12-06 ENCOUNTER — OFFICE VISIT (OUTPATIENT)
Dept: WOUND CARE | Facility: CLINIC | Age: 73
End: 2023-12-06
Payer: MEDICARE

## 2023-12-06 PROCEDURE — 11042 DBRDMT SUBQ TIS 1ST 20SQCM/<: CPT

## 2023-12-08 ENCOUNTER — APPOINTMENT (OUTPATIENT)
Dept: OTOLARYNGOLOGY | Facility: CLINIC | Age: 73
End: 2023-12-08
Payer: MEDICARE

## 2023-12-11 ENCOUNTER — LAB (OUTPATIENT)
Dept: LAB | Facility: CLINIC | Age: 73
End: 2023-12-11
Payer: MEDICARE

## 2023-12-11 DIAGNOSIS — D50.0 IRON DEFICIENCY ANEMIA DUE TO CHRONIC BLOOD LOSS: ICD-10-CM

## 2023-12-11 DIAGNOSIS — E53.8 VITAMIN B12 DEFICIENCY: ICD-10-CM

## 2023-12-11 LAB
ALBUMIN SERPL BCP-MCNC: 3.9 G/DL (ref 3.4–5)
ALP SERPL-CCNC: 60 U/L (ref 33–136)
ALT SERPL W P-5'-P-CCNC: 9 U/L (ref 7–45)
ANION GAP SERPL CALC-SCNC: 14 MMOL/L (ref 10–20)
AST SERPL W P-5'-P-CCNC: 14 U/L (ref 9–39)
BASOPHILS # BLD AUTO: 0.03 X10*3/UL (ref 0–0.1)
BASOPHILS NFR BLD AUTO: 0.5 %
BILIRUB SERPL-MCNC: 0.5 MG/DL (ref 0–1.2)
BUN SERPL-MCNC: 12 MG/DL (ref 6–23)
CALCIUM SERPL-MCNC: 9 MG/DL (ref 8.6–10.3)
CHLORIDE SERPL-SCNC: 105 MMOL/L (ref 98–107)
CO2 SERPL-SCNC: 26 MMOL/L (ref 21–32)
CREAT SERPL-MCNC: 1.18 MG/DL (ref 0.5–1.05)
EOSINOPHIL # BLD AUTO: 0.17 X10*3/UL (ref 0–0.4)
EOSINOPHIL NFR BLD AUTO: 3 %
ERYTHROCYTE [DISTWIDTH] IN BLOOD BY AUTOMATED COUNT: 15.9 % (ref 11.5–14.5)
FERRITIN SERPL-MCNC: 78 NG/ML (ref 8–150)
GFR SERPL CREATININE-BSD FRML MDRD: 49 ML/MIN/1.73M*2
GLUCOSE SERPL-MCNC: 80 MG/DL (ref 74–99)
HCT VFR BLD AUTO: 37.7 % (ref 36–46)
HGB BLD-MCNC: 11.6 G/DL (ref 12–16)
IMM GRANULOCYTES # BLD AUTO: 0.02 X10*3/UL (ref 0–0.5)
IMM GRANULOCYTES NFR BLD AUTO: 0.4 % (ref 0–0.9)
IRON SATN MFR SERPL: 12 % (ref 25–45)
IRON SERPL-MCNC: 45 UG/DL (ref 35–150)
LYMPHOCYTES # BLD AUTO: 1.04 X10*3/UL (ref 0.8–3)
LYMPHOCYTES NFR BLD AUTO: 18.3 %
MCH RBC QN AUTO: 28.3 PG (ref 26–34)
MCHC RBC AUTO-ENTMCNC: 30.8 G/DL (ref 32–36)
MCV RBC AUTO: 92 FL (ref 80–100)
MONOCYTES # BLD AUTO: 0.29 X10*3/UL (ref 0.05–0.8)
MONOCYTES NFR BLD AUTO: 5.1 %
NEUTROPHILS # BLD AUTO: 4.13 X10*3/UL (ref 1.6–5.5)
NEUTROPHILS NFR BLD AUTO: 72.7 %
NRBC BLD-RTO: 0 /100 WBCS (ref 0–0)
PLATELET # BLD AUTO: 315 X10*3/UL (ref 150–450)
POTASSIUM SERPL-SCNC: 4.2 MMOL/L (ref 3.5–5.3)
PROT SERPL-MCNC: 6.3 G/DL (ref 6.4–8.2)
RBC # BLD AUTO: 4.1 X10*6/UL (ref 4–5.2)
SODIUM SERPL-SCNC: 141 MMOL/L (ref 136–145)
TIBC SERPL-MCNC: 362 UG/DL (ref 240–445)
TSH SERPL-ACNC: 0.99 MIU/L (ref 0.44–3.98)
UIBC SERPL-MCNC: 317 UG/DL (ref 110–370)
WBC # BLD AUTO: 5.7 X10*3/UL (ref 4.4–11.3)

## 2023-12-11 PROCEDURE — 82728 ASSAY OF FERRITIN: CPT | Mod: PARLAB

## 2023-12-11 PROCEDURE — 85025 COMPLETE CBC W/AUTO DIFF WBC: CPT

## 2023-12-11 PROCEDURE — 82607 VITAMIN B-12: CPT | Mod: PARLAB

## 2023-12-11 PROCEDURE — 82746 ASSAY OF FOLIC ACID SERUM: CPT | Mod: PARLAB

## 2023-12-11 PROCEDURE — 36415 COLL VENOUS BLD VENIPUNCTURE: CPT

## 2023-12-11 PROCEDURE — 84443 ASSAY THYROID STIM HORMONE: CPT

## 2023-12-11 PROCEDURE — 83540 ASSAY OF IRON: CPT

## 2023-12-11 PROCEDURE — 84075 ASSAY ALKALINE PHOSPHATASE: CPT

## 2023-12-11 NOTE — PROGRESS NOTES
History of Present Illness    Abigail Flores is a 73 y.o. female who ***      Past Medical History    ***    Physical Exam    ***    Assessment and Plan    ***

## 2023-12-12 ENCOUNTER — APPOINTMENT (OUTPATIENT)
Dept: OTOLARYNGOLOGY | Facility: CLINIC | Age: 73
End: 2023-12-12
Payer: MEDICARE

## 2023-12-12 LAB
FOLATE SERPL-MCNC: 11.7 NG/ML
VIT B12 SERPL-MCNC: 963 PG/ML (ref 211–911)

## 2023-12-14 ENCOUNTER — OFFICE VISIT (OUTPATIENT)
Dept: WOUND CARE | Facility: CLINIC | Age: 73
End: 2023-12-14
Payer: MEDICARE

## 2023-12-14 PROCEDURE — 11042 DBRDMT SUBQ TIS 1ST 20SQCM/<: CPT

## 2023-12-14 PROCEDURE — 11042 DBRDMT SUBQ TIS 1ST 20SQCM/<: CPT | Performed by: NURSE PRACTITIONER

## 2023-12-18 ENCOUNTER — OFFICE VISIT (OUTPATIENT)
Dept: HEMATOLOGY/ONCOLOGY | Facility: CLINIC | Age: 73
End: 2023-12-18
Payer: MEDICARE

## 2023-12-18 VITALS
DIASTOLIC BLOOD PRESSURE: 67 MMHG | WEIGHT: 188.93 LBS | HEIGHT: 62 IN | TEMPERATURE: 97.9 F | RESPIRATION RATE: 18 BRPM | BODY MASS INDEX: 34.77 KG/M2 | HEART RATE: 70 BPM | OXYGEN SATURATION: 96 % | SYSTOLIC BLOOD PRESSURE: 144 MMHG

## 2023-12-18 DIAGNOSIS — D64.9 ANEMIA, UNSPECIFIED TYPE: ICD-10-CM

## 2023-12-18 DIAGNOSIS — E53.8 VITAMIN B12 DEFICIENCY: Primary | ICD-10-CM

## 2023-12-18 PROCEDURE — 1160F RVW MEDS BY RX/DR IN RCRD: CPT | Performed by: INTERNAL MEDICINE

## 2023-12-18 PROCEDURE — 3078F DIAST BP <80 MM HG: CPT | Performed by: INTERNAL MEDICINE

## 2023-12-18 PROCEDURE — 99215 OFFICE O/P EST HI 40 MIN: CPT | Performed by: INTERNAL MEDICINE

## 2023-12-18 PROCEDURE — 3008F BODY MASS INDEX DOCD: CPT | Performed by: INTERNAL MEDICINE

## 2023-12-18 PROCEDURE — 1159F MED LIST DOCD IN RCRD: CPT | Performed by: INTERNAL MEDICINE

## 2023-12-18 PROCEDURE — 3044F HG A1C LEVEL LT 7.0%: CPT | Performed by: INTERNAL MEDICINE

## 2023-12-18 PROCEDURE — 3077F SYST BP >= 140 MM HG: CPT | Performed by: INTERNAL MEDICINE

## 2023-12-18 PROCEDURE — 4010F ACE/ARB THERAPY RXD/TAKEN: CPT | Performed by: INTERNAL MEDICINE

## 2023-12-18 PROCEDURE — 1126F AMNT PAIN NOTED NONE PRSNT: CPT | Performed by: INTERNAL MEDICINE

## 2023-12-18 PROCEDURE — 1036F TOBACCO NON-USER: CPT | Performed by: INTERNAL MEDICINE

## 2023-12-18 ASSESSMENT — PAIN SCALES - GENERAL: PAINLEVEL: 0-NO PAIN

## 2023-12-18 NOTE — PROGRESS NOTES
KROY HANSON is a 73 year old Female        Interval History:    hematuria.  present illness:      The patient is a 73-year-old woman with past medical history of hypertension, diabetes mellitus, chronic lymphedema of legs, arthritis, peptic ulcer disease, carcinoma of uterus s/p JACQUELINE/BSO, history of carcinoma of tongue s/p partial glossectomy.  Routine  CBC on 2023 revealed WBC 8.2 hemoglobin 9.9 g/dL MCV 81 platelets 378 1000/mm³.  Differential count revealed 74% neutrophils 16% lymphocytes 6% monocytes 3% eosinophils 1% basophils vitamin B12 level of 155 PG per mL creatinine 1.38 mg/dL  serum iron 22 mcg/dL, TIBC 462 mcg/dL iron saturation 5% ferritin 11 mcg/L.  The patient was referred for further evaluation and management.         At interview on 2023 the patient narrated entire history.  She denied history of weight loss, night sweats, chest pain, shortness of breath at rest, nausea, vomiting, hematemesis, melena, hematochezia and hematuria.      The patient had come for follow-up on 2023 regarding history of complex and multifactorial anemia.  The patient complains of feeling profoundly weak and tired and is requesting assistance.     Past medical history, hypertension, diabetes mellitus, elevated creatinine, peptic ulcer disease, arthritis, carcinoma of uterus, s/p JACQUELINE/BSO, history of tongue cancer status post partial glossectomy.      Past surgical history: History of JACQUELINE/BSO did not receive chemo or radiation therapy      S/p partial glossectomy did not receive chemo or radiation therapy.      Mammogram: 2 years ago.      Never had a colonoscopy.      Family history:      Mother  of lung cancer      Father  of MI.      Personal history and social history:      73 years old, , has 2 children.  Worked in market research.  Smoked 1-1/2 packs/day for 50 years quit 15 years ago no history of alcohol abuse drug abuse     Review of Systems:   ·  System Review All other  systems have been reviewed and are negative for complaint.            Allergies and Intolerances:       Allergies:         NKDA: Active         contrast (specific type unknown): Contrast, Swelling/Edema, Active         Strawberry: Food, Hives/Urticaria, Active     Outpatient Medication Profile:  * Patient Currently Takes Medications as of 14-Aug-2023 15:00 documented in Structured Notes         pantoprazole 40 mg oral delayed release  tablet: Last Dose Taken:  , 1 tab(s) orally 2 times a day, Start Date: 18-Jun-2021         docusate sodium 100 mg oral capsule: Last Dose Taken:  , 1 cap(s) orally  2 times a day, Start Date: 18-Jun-2021         bisacodyl 10 mg rectal suppository: Last Dose Taken:  , 1 suppository(ies)  rectal once, Start Date: 18-Jun-2021         lisinopril 10 mg oral tablet: Last Dose Taken:  , 1 tab(s) orally once  a day, Start Date: 18-Jun-2021         acetaminophen 325 mg oral tablet: Last Dose Taken:  , 2 tab(s) orally  every 4 hours, As needed, Headache, Start Date: 18-Jun-2021         lisinopril 10 mg oral tablet: Last Dose Taken:  , Crush 1 tab(s) in applesauce  and take orally once a day (in the morning)         glyBURIDE-metFORMIN 2.5 mg-500 mg oral tablet: Last Dose Taken:  , Crush  2 tab(s) in applesauce and take orally 2 times a day         clotrimazole-betamethasone dipropionate 1%-0.05% topical cream: Last  Dose Taken:  , Apply topically to lower abdomen/groin area once a day         memantine 10 mg oral tablet: Last Dose Taken:  , Crush 1 tab(s) in applesauce  and take orally 2 times a day         traMADol 50 mg oral tablet: Last Dose Taken:  , Crush 2 tab(s) in applesauce  and take orally 2 times a day        Family History: No Family History items are recorded  in the problem list.      Social History:   Social Substance History:  ·  Social History denies smoking, alcohol and drug use   ·  Smoking Status former smoker (1)   ·  Tobacco Use denies   ·  Alcohol Use denies   ·  Drug Use  denies            Vitals and Measurements:   Vitals: Temp: 36.5  HR: 74  RR: 18  BP: 130/61  SPO2%:   99   Measurements: HT(cm): 158  WT(kg): 84.2  BSA: 1.92   BMI:  33.7      Physical Exam:      Constitutional: Well developed, awake/alert/oriented  x3, no distress, alert and cooperative   Eyes: PERRL, EOMI, clear sclera   ENMT: mucous membranes moist, no apparent injury,  no lesions seen   Head/Neck: Neck supple, no apparent injury, thyroid  without mass or tenderness, No JVD, trachea midline, no bruits   Respiratory/Thorax: Patent airways, CTAB, normal  breath sounds with good chest expansion, thorax symmetric   Cardiovascular: Regular, rate and rhythm, no murmurs,  2+ equal pulses of the extremities, normal S 1and S 2   Gastrointestinal: Nondistended, soft, non-tender,  no rebound tenderness or guarding, no masses palpable, no organomegaly, +BS, no bruits   Genitourinary: No Discharge, vesicles or other abnormalities   Musculoskeletal: ROM intact, no joint swelling, normal  strength   Extremities: normal extremities, no cyanosis , contusions  or wounds, no clubbing  Bilateral lower extremity lymphedema   Neurological: alert and oriented x3, intact senses,  motor, response and reflexes, normal strength   Breast: No masses, tenderness, no discharge or discoloration   Lymphatic: No significant lymphadenopathy   Psychological: Appropriate mood and behavior   Skin: Warm and dry, no lesions, no rashes         Lab Results:           Assessment and Plan:         The patient is a 73-year-old woman with past medical history of hypertension, diabetes mellitus, chronic lymphedema of legs, arthritis, peptic ulcer disease, carcinoma of uterus s/p JACQUELINE/BSO, history of carcinoma of tongue s/p partial glossectomy.  Routine  CBC on July 12, 2023 revealed WBC 8.2 hemoglobin 9.9 g/dL MCV 81 platelets 378 1000/mm³.  Differential count revealed 74% neutrophils 16% lymphocytes 6% monocytes 3% eosinophils 1% basophils vitamin B12 level  of 155 PG per mL creatinine 1.38 mg/dL  serum iron 22 mcg/dL, TIBC 462 mcg/dL iron saturation 5% ferritin 11 mcg/L.  The patient was referred for further evaluation and management.      I had a detailed discussion with the patient and explained to her about iron and vitamin B12 metabolism.  The patient has vitamin B12 as well as iron deficiency.  Physical examination revealed bilateral lower extremity lymphedema and conjunctival pallor.   It would be prudent to rule out other deficiencies such as folate and TSH levels.  Once all the information is available we will make further recommendations.  The patient understood appreciated all the details provided and was grateful.     The patient had come for follow-up on August 14, 2023 regarding history of multifactorial and complex anemia.  The patient is symptomatic and complains of feeling profoundly weak and tired.      Iron deficiency anemia.  On August 14, 2023.  I have recommended intravenous Feraheme 510 mg/week x 2 weeks.      Vitamin B12 deficiency: Level of 153 PG per mL on August 14, 2023.  Antiparietal cell antibody, and intrinsic factor antibodies were negative.  Recommend vitamin B12 1000 mcg subcu daily for 5 days, then q. weekly x4 weeks, and then every 4 weeks there  after.  Recommend starting vitamin B12 and iron replacement therapy at the same time.     Vitamin D level of 10 NG per mL on August 14, 2023 recommend vitamin D 2, 50,000 units by mouth once a week for 12 weeks followed by over-the-counter vitamin D3 1000 units/day there after.    The patient had come for follow-up on December 18, 2023 regarding history of iron deficiency anemia and vitamin B12 deficiency anemia.  Patient received both vitamin B12 and iron replacement therapy in August 2023.  She is feeling much better.  Hemoglobin improved up to 11.6 g/dL.  Iron indicis vitamin B12 in reference range.  I have noticed elevated creatinine that could be contributing to anemia recommend  nephrology evaluation.  The patient to return in 6 months.     Peptic ulcer disease:      Protonix 40 mg p.o. daily.      Hypertension:      Lisinopril 10 mg p.o. daily.      Diabetes mellitus:      Glyburide/metformin 2.5 mg / 500 mg p.o. twice daily.

## 2023-12-21 ENCOUNTER — OFFICE VISIT (OUTPATIENT)
Dept: WOUND CARE | Facility: CLINIC | Age: 73
End: 2023-12-21
Payer: MEDICARE

## 2023-12-21 PROCEDURE — 99213 OFFICE O/P EST LOW 20 MIN: CPT | Mod: 25

## 2023-12-28 ENCOUNTER — OFFICE VISIT (OUTPATIENT)
Dept: WOUND CARE | Facility: CLINIC | Age: 73
End: 2023-12-28
Payer: MEDICARE

## 2023-12-28 PROCEDURE — 99213 OFFICE O/P EST LOW 20 MIN: CPT | Performed by: NURSE PRACTITIONER

## 2023-12-28 PROCEDURE — 99213 OFFICE O/P EST LOW 20 MIN: CPT | Mod: 25

## 2024-01-02 ENCOUNTER — OFFICE VISIT (OUTPATIENT)
Dept: OTOLARYNGOLOGY | Facility: CLINIC | Age: 74
End: 2024-01-02
Payer: MEDICARE

## 2024-01-02 ENCOUNTER — APPOINTMENT (OUTPATIENT)
Dept: OTOLARYNGOLOGY | Facility: CLINIC | Age: 74
End: 2024-01-02
Payer: MEDICARE

## 2024-01-02 VITALS — HEIGHT: 64 IN | WEIGHT: 186.5 LBS | BODY MASS INDEX: 31.84 KG/M2 | TEMPERATURE: 97.4 F

## 2024-01-02 DIAGNOSIS — K14.8 TONGUE LESION: ICD-10-CM

## 2024-01-02 PROCEDURE — 1126F AMNT PAIN NOTED NONE PRSNT: CPT | Performed by: OTOLARYNGOLOGY

## 2024-01-02 PROCEDURE — 1036F TOBACCO NON-USER: CPT | Performed by: OTOLARYNGOLOGY

## 2024-01-02 PROCEDURE — 4010F ACE/ARB THERAPY RXD/TAKEN: CPT | Performed by: OTOLARYNGOLOGY

## 2024-01-02 PROCEDURE — 3008F BODY MASS INDEX DOCD: CPT | Performed by: OTOLARYNGOLOGY

## 2024-01-02 PROCEDURE — 1160F RVW MEDS BY RX/DR IN RCRD: CPT | Performed by: OTOLARYNGOLOGY

## 2024-01-02 PROCEDURE — 99213 OFFICE O/P EST LOW 20 MIN: CPT | Performed by: OTOLARYNGOLOGY

## 2024-01-02 PROCEDURE — 1159F MED LIST DOCD IN RCRD: CPT | Performed by: OTOLARYNGOLOGY

## 2024-01-02 RX ORDER — TRIAMCINOLONE ACETONIDE 1 MG/G
PASTE DENTAL 3 TIMES DAILY
Qty: 5 G | Refills: 1 | Status: SHIPPED | OUTPATIENT
Start: 2024-01-02 | End: 2024-01-16

## 2024-01-02 ASSESSMENT — PATIENT HEALTH QUESTIONNAIRE - PHQ9
SUM OF ALL RESPONSES TO PHQ9 QUESTIONS 1 AND 2: 0
2. FEELING DOWN, DEPRESSED OR HOPELESS: NOT AT ALL
1. LITTLE INTEREST OR PLEASURE IN DOING THINGS: NOT AT ALL

## 2024-01-02 NOTE — PROGRESS NOTES
72 yo woman who had a right lateral tongue cancer resected by Dr. Emmanuel in 2015, reconstructed by Dr. Morris with a Left RFFF.  Did not receive adjuvant radiotherapy. No evidence of recurrence since. Has had issues with follow up due to transportation difficulties. Reports a persistent sore tongue with an ulcer on the left since this summer. Not sure if it is getting bigger. No associated lymphadenopathy.       Past medical history: tongue cancer    Past surgical history: resection right tongue cancer with Left RFF    Family history:  Reviewed and not relevant to the presenting complaint    Current medications:      Reviewed as noted in current orders     Allergies:                               Reviewed and as noted in current orders    ROS:  All other systems have been reviewed and are negative for complaint. I personally reviewed the intake form that was scanned in today    PE:  CONSTITUTIONAL:  Vitals  -reviewed from intake field, well developed, well nourished.    VOICE:    RESPIRATION:  Breathing comfortably, no stridor.  CV:  No clubbing/cyanosis/edema in hands.  EYES:  EOM Intact, sclera normal.  NEURO:  Alert and oriented times 3, Cranial nerves II-XII intact and symmetric bilaterally.  HEAD AND FACE:  Symmetric facial features,  no masses or lesions, sinuses nontender to palpation.  SALIVARY GLANDS:  Parotid and submandibular glands normal bilaterally.  EARS:  Normal external ears, external auditory canals, and TMs to otoscopy, normal hearing to whispered voice.  NOSE:  External nose midline, anterior rhinoscopy is normal with limited visualization to the anterior aspect of the inferior turbinates.  No lesions noted.    ORAL CAVITY/OROPHARYNX/LIPS:  right tongue free flap well healed. No dehiscence. On the left lateral tongue there is a 1cm area of ulceration along the tooth line. Ulcer located adjacent to left molar.   PHARYNGEAL WALLS AND NASOPHARYNX:  No masses noted. Mucosa appears clean and  moist  NECK/LYMPH:  No LAD, no thyroid masses. Trachea palpably midline  SKIN:  Neck skin is without scar or injury  PSYCH:  Alert and oriented with appropriate mood and affect      A/P:  73 year old female follow up after partial glossectomy, neck dissection in 2015 path - 3.3 cm tumor with 9 mm DOI, no PNI or LVI, 4 mm from deep margin, no lymph nodes involved. Flap site well healed today. Small area of likely contact ulcer along left lateral tongue, not concerning for new cancer. Discussed biopsy vs triamcinolone paste. She would like to try steroid paste and follow up in 3 weeks.     -follow up in 3-4 weeks   -rec triamcinolone paste to tongue lesion at least 3 times a day       The above resident note has been reviewed and confirmed.  Patient was seen and examined with the resident today.  Documentation has been reviewed.

## 2024-01-03 ENCOUNTER — APPOINTMENT (OUTPATIENT)
Dept: OTOLARYNGOLOGY | Facility: CLINIC | Age: 74
End: 2024-01-03
Payer: MEDICARE

## 2024-01-04 ENCOUNTER — OFFICE VISIT (OUTPATIENT)
Dept: WOUND CARE | Facility: CLINIC | Age: 74
End: 2024-01-04
Payer: MEDICARE

## 2024-01-04 PROCEDURE — 99212 OFFICE O/P EST SF 10 MIN: CPT | Mod: 25

## 2024-01-04 PROCEDURE — 99213 OFFICE O/P EST LOW 20 MIN: CPT | Performed by: SURGERY

## 2024-01-29 ENCOUNTER — OFFICE VISIT (OUTPATIENT)
Dept: PRIMARY CARE | Facility: CLINIC | Age: 74
End: 2024-01-29
Payer: MEDICARE

## 2024-01-29 VITALS
HEIGHT: 62 IN | DIASTOLIC BLOOD PRESSURE: 61 MMHG | BODY MASS INDEX: 35.11 KG/M2 | HEART RATE: 81 BPM | SYSTOLIC BLOOD PRESSURE: 112 MMHG | RESPIRATION RATE: 16 BRPM | OXYGEN SATURATION: 97 % | WEIGHT: 190.8 LBS | TEMPERATURE: 98 F

## 2024-01-29 DIAGNOSIS — M25.561 CHRONIC PAIN OF BOTH KNEES: ICD-10-CM

## 2024-01-29 DIAGNOSIS — G89.29 CHRONIC PAIN OF BOTH KNEES: ICD-10-CM

## 2024-01-29 DIAGNOSIS — M25.562 CHRONIC PAIN OF BOTH KNEES: ICD-10-CM

## 2024-01-29 DIAGNOSIS — E11.42 TYPE 2 DIABETES MELLITUS WITH POLYNEUROPATHY (MULTI): Primary | ICD-10-CM

## 2024-01-29 LAB — POC FINGERSTICK BLOOD GLUCOSE: 118 MG/DL (ref 70–100)

## 2024-01-29 PROCEDURE — 1160F RVW MEDS BY RX/DR IN RCRD: CPT | Performed by: NURSE PRACTITIONER

## 2024-01-29 PROCEDURE — 4010F ACE/ARB THERAPY RXD/TAKEN: CPT | Performed by: NURSE PRACTITIONER

## 2024-01-29 PROCEDURE — 3008F BODY MASS INDEX DOCD: CPT | Performed by: NURSE PRACTITIONER

## 2024-01-29 PROCEDURE — 99213 OFFICE O/P EST LOW 20 MIN: CPT | Mod: ZK | Performed by: NURSE PRACTITIONER

## 2024-01-29 PROCEDURE — 3078F DIAST BP <80 MM HG: CPT | Performed by: NURSE PRACTITIONER

## 2024-01-29 PROCEDURE — 99213 OFFICE O/P EST LOW 20 MIN: CPT | Performed by: NURSE PRACTITIONER

## 2024-01-29 PROCEDURE — 1036F TOBACCO NON-USER: CPT | Performed by: NURSE PRACTITIONER

## 2024-01-29 PROCEDURE — 1125F AMNT PAIN NOTED PAIN PRSNT: CPT | Performed by: NURSE PRACTITIONER

## 2024-01-29 PROCEDURE — 1159F MED LIST DOCD IN RCRD: CPT | Performed by: NURSE PRACTITIONER

## 2024-01-29 PROCEDURE — 3074F SYST BP LT 130 MM HG: CPT | Performed by: NURSE PRACTITIONER

## 2024-01-29 SDOH — ECONOMIC STABILITY: FOOD INSECURITY: WITHIN THE PAST 12 MONTHS, YOU WORRIED THAT YOUR FOOD WOULD RUN OUT BEFORE YOU GOT MONEY TO BUY MORE.: NEVER TRUE

## 2024-01-29 SDOH — ECONOMIC STABILITY: FOOD INSECURITY: WITHIN THE PAST 12 MONTHS, THE FOOD YOU BOUGHT JUST DIDN'T LAST AND YOU DIDN'T HAVE MONEY TO GET MORE.: NEVER TRUE

## 2024-01-29 ASSESSMENT — COLUMBIA-SUICIDE SEVERITY RATING SCALE - C-SSRS
1. IN THE PAST MONTH, HAVE YOU WISHED YOU WERE DEAD OR WISHED YOU COULD GO TO SLEEP AND NOT WAKE UP?: NO
2. HAVE YOU ACTUALLY HAD ANY THOUGHTS OF KILLING YOURSELF?: NO
6. HAVE YOU EVER DONE ANYTHING, STARTED TO DO ANYTHING, OR PREPARED TO DO ANYTHING TO END YOUR LIFE?: NO

## 2024-01-29 ASSESSMENT — ENCOUNTER SYMPTOMS
DEPRESSION: 0
LOSS OF SENSATION IN FEET: 0
OCCASIONAL FEELINGS OF UNSTEADINESS: 1

## 2024-01-29 ASSESSMENT — PATIENT HEALTH QUESTIONNAIRE - PHQ9
2. FEELING DOWN, DEPRESSED OR HOPELESS: NOT AT ALL
SUM OF ALL RESPONSES TO PHQ9 QUESTIONS 1 AND 2: 0
1. LITTLE INTEREST OR PLEASURE IN DOING THINGS: NOT AT ALL

## 2024-01-29 ASSESSMENT — PAIN SCALES - GENERAL: PAINLEVEL: 8

## 2024-01-29 NOTE — PROGRESS NOTES
Subjective   Patient ID: Abigail Flores is a 73 y.o. female who presents for Follow-up (Fuv- blood work results. And general check up).  [id number] y.o. female presents today for follow up to bloodwork.    Dr. Castaneda following for multifactorial anemia.   Iron infusions and daily supplements.  Scheduled for follow up in 6 months.     I did review with patient her labs dated 12/11/2023 - stable kidney function with gfr at 49.  Normal liver function.  Normal electrolytes.  Normal thyroid.      Hemoglobin A1C 7/12/2023 --6.3 - will repeat study.   POC glucose today 118.    Shoveled snow last week and noted muscle discomfort in chest wall starting several hours after. Characterized as dull achey and reproducible.  Feels better today than onset with shoveling.  Currently with minimal discomfort which is reproducible.   Full range of motion.     Chronic bilateral knee pain.   Patient referred to Pain management for further evaluation last visit and was encouraged to follow up as she is on long term pain medication.   She was told at last visit that she would need to follow up with their recommendations.  Concern raised for over use of tramadol and need for precautions and fall risk.   She verbalizes understanding.    I will not be prescribing tramadol.  Alternate therapies discussed.   She states she will follow up with pain management.    Review of Systems  Review of systems: Present-feeling well. Not present-chills, fatigue and fever.  Skin: Not present-new lesions and rash.  HEENT: Not present-headache, ear pain, nasal congestion, and sore throat.  Neck: Not present-neck pain, neck stiffness and swollen glands.  Respiratory: Not present-difficulty breathing, cough, bloody sputum.  Cardiovascular: Not present-abnormal blood pressure, chest pain, edema, palpitations, dyspnea on exertion.  Gastrointestinal: Not present-abdominal pain, bloody or very black stools, changes in bowel habits, heartburn, nausea and  "vomiting.  Genitourinary: Not present-change in bladder habits, hematuria, flank pain and dysuria.  Musculoskeletal: Chronic pain to bilateral knees and cervical spine.  Arthritis to hands as well.  Uses walker for support.   She has lymphedema to her left lower leg.  Neurological: Not present-dizziness, headache.    Objective   /61 (BP Location: Right arm, Patient Position: Sitting, BP Cuff Size: Adult)   Pulse 81   Temp 36.7 °C (98 °F) (Temporal)   Resp 16   Ht 1.575 m (5' 2\")   Wt 86.5 kg (190 lb 12.8 oz)   SpO2 97%   BMI 34.90 kg/m²      Physical Exam  Gen.: Mental status-alert. Gen. appearance-cooperative, well groomed and consistent with stated age. Not in acute distress or sickly. Orientation-oriented to time, place, purpose and person. Build and nutrition-well-nourished and well-developed. Hydration-well-hydrated.    Integumentary: Color-normal coloration skin. Skin moisture-normal skin moisture.    Head and neck: Head-normocephalic, atraumatic with no lesions or palpable masses. Face-atraumatic. Neck-full range of motion and subtle. No lymphadenopathy and no nuchal rigidity.     Chest and lung exam: Chest and lung exam reveals-normal excursion with symmetric chest walls, quiet, even and easy respiratory effort with no use of accessory muscles.  Auscultation-normal breath sounds, no adventitious lung sounds and normal vocal resonance. Chest wall is normal in shape and non-tender.    Cardiovascular: Auscultation: Regular rate and rhythm. Heart sounds-normal heart sounds, S1-S2. No murmurs or gallops appreciated. Carotid arteries normal and without bruit.    Abdomen: Non-tender, no rigidity, and no palpable masses. There is no hepatosplenomegaly. Auscultation-auscultation of the abdomen reveals normal bowel sounds throughout.     Peripheral vascular: Lower extremity-inspection is normal bilaterally. Normal temperature and no edema bilaterally.    Neurologic: Mental [id number]. " Cranial nerves: Cranial nerves II through XII grossly intact. Front loaded.      Musculoskeletal:  No step offs or point tenderness along spine.   Significant lymphedema to left > right lower extremity - chronic.      Assessment/Plan   Diagnoses and all orders for this visit:  Type 2 diabetes mellitus with polyneuropathy (CMS/HCC)  -     POCT Fingerstick Glucose manually resulted  -     Hemoglobin A1C; Future  -     Lipid Panel; Future  Chronic pain of both knees  -     Referral to Pain Medicine; Future

## 2024-01-29 NOTE — PATIENT INSTRUCTIONS
Chronic lymphedema left> right, bilateral knee pain and arthritis to bilateral hands.   Referred to pain management for chronic pain.      Impaired glucose tolerance - Glucose 118 today   She will have [id number] and lipid panel obtained.   She will be notified of results as they become available.      Follow up in the next few months for medicare wellness exam.

## 2024-01-31 DIAGNOSIS — G89.29 CHRONIC PAIN OF BOTH KNEES: ICD-10-CM

## 2024-01-31 DIAGNOSIS — M25.561 CHRONIC PAIN OF BOTH KNEES: ICD-10-CM

## 2024-01-31 DIAGNOSIS — M25.562 CHRONIC PAIN OF BOTH KNEES: ICD-10-CM

## 2024-01-31 RX ORDER — TRAMADOL HYDROCHLORIDE 50 MG/1
100 TABLET ORAL 2 TIMES DAILY
Qty: 120 TABLET | Refills: 0 | Status: SHIPPED | OUTPATIENT
Start: 2024-01-31 | End: 2024-02-28 | Stop reason: SDUPTHER

## 2024-01-31 RX ORDER — TRAMADOL HYDROCHLORIDE 50 MG/1
100 TABLET ORAL 2 TIMES DAILY
Qty: 360 TABLET | Refills: 0 | OUTPATIENT
Start: 2024-01-31

## 2024-02-13 NOTE — H&P (VIEW-ONLY)
History of Present Complaint:  The patient was referred to us by Referring Provider: Mariah Mcdonald, APRN-CNP chronic bilateral knee pain. this is 73 y.o.  female with a past history of obesity BMI 35, diabetes, hypertension, diabetes mellitus, chronic lymphedema of legs, arthritis, peptic ulcer disease, carcinoma of uterus s/p JACQUELINE/BSO, history of carcinoma of tongue s/p partial glossectomy, PUD, on tramadol x 4/day, presenting with advanced knee arthritis with multiple arthritis in her body in addition to massive lymphedema in the left leg to a mild lymphedema in the right leg who takes 4 tramadol per day and her nurse practitioner does not prescribe tramadol and her primary physician retired and she is here to continue tramadol therapy.        Procedures:   None recently    Portions of record reviewed for pertinent issues: active problem list, medication list, allergies, family history, social history, notes from last encounter, encounters, lab results, imaging and other available records.    I have personally reviewed the [place]AR[place] report for this patient. This report is scanned into the electronic medical record. I have considered the risks of abuse, dependence, addiction and diversion. It showed: Tramadol 4 times daily from Mariah Mcdonald  OPIOID RISK ASSESSMENT SCORE 0/26  Opioid agreement: 2/16/2024  Activities of daily living: Active for her age and condition  Adverse effects: None  Analgesia: W/O 8/10, W 4/10 2 tramadol's in the morning and 2 tramadol's in the evening  Toxicology screen: 2/16/2024  Aberrant behavior: None  Patient is being treated with tramadol therapy for pain and is responding appropriately.  There are NO signs of opioid intoxication, abuse, addiction or withdrawal.  Pupils are equal, reactive to light bilateral, appropriate speech and cognition. Patient denies any opioid induced constipation. The OARRS registry followed periodically, urine toxicology completed and appropriate.     Patient is  advised and warned  in specific detail about potential benefits of opioids along with risks and side effects including, but not limited to, dependency, addiction, tolerance, hyperalgesia, anxiety, depression, insomnia, endocrine changes, immunologic disturbances, respiratory depression and death.     Caution advised regarding the use of medications prescribed at this office and specific mention made regarding not to drive or operate heavy machinery if feeling side effects from this the medications. Patient  expressed an understanding in regards to these particular concerns.     Discussed non-opioid options including (But no limited), physical wellness, antiinflammatory diet, icing and heating, meditation, relaxation, massage and acupuncture.    We will continue to monitor and adjust medications as needed.        Diagnostic studies:  9/23/2023 DEXA scan did not show any major osteoporosis  12/21/2020 C-spine x-ray showed degenerative changes some spondylosis and anterolisthesis C4-C6    Employment/disability/litigation: Retired retail and market research    Social History:  has 2 children and 3 grandchildren denies smoking drinking or use of illicit drugs      Review of Systems   HENT: Negative.     Eyes: Negative.    Respiratory: Negative.     Cardiovascular: Negative.    Gastrointestinal: Negative.    Endocrine: Negative.    Genitourinary: Negative.    Musculoskeletal:  Positive for arthralgias, back pain, myalgias and neck pain.   Skin: Negative.    Neurological: Negative.    Hematological: Negative.    Psychiatric/Behavioral: Negative.            Physical Exam  Vitals and nursing note reviewed.   Constitutional:       Appearance: Normal appearance.   HENT:      Head: Normocephalic and atraumatic.      Nose: Nose normal.   Eyes:      Extraocular Movements: Extraocular movements intact.      Conjunctiva/sclera: Conjunctivae normal.      Pupils: Pupils are equal, round, and reactive to light.    Cardiovascular:      Rate and Rhythm: Normal rate and regular rhythm.      Pulses: Normal pulses.      Heart sounds: Normal heart sounds.   Pulmonary:      Effort: Pulmonary effort is normal.      Breath sounds: Normal breath sounds.   Abdominal:      General: Abdomen is flat. Bowel sounds are normal.      Palpations: Abdomen is soft.   Musculoskeletal:         General: Tenderness present.      Comments: Large lymphedema in the left leg mild to moderate lymphedema in the right leg tenderness the cervical spine with decreased range of motion, deformed joints in the hands   Skin:     General: Skin is warm.   Neurological:      General: No focal deficit present.      Mental Status: She is alert and oriented to person, place, and time.   Psychiatric:         Mood and Affect: Mood normal.         Behavior: Behavior normal.            Assessment  73 years old with significant history of multiple osteoarthritis in addition to cervical spondylosis with left arm radiculopathy and left leg large lymphedema and mild to moderate right leg lymphedema takes tramadol 4 tablets a day from her retired PCP and now her nurse practitioner does not prescribe tramadol and sent her to us.  We will take over her tramadol therapy and will order x-rays for her cervical and her knees.  Most likely she will need knee injections in addition to cervical epidural steroid injection to help with the spondylosis and left arm radiculopathy.  The patient also I think will be a candidate for Zilretta injections.  Agreement and urine screen done today           Plan  At least 50% of the visit was involved in the discussion of the options for treatment. We discussed exercises, medication, interventional therapies and surgery. Healthy life style is essential with patient hard work to achieve the wellness. In addition; discussion with the patient and/or family about any of the diagnostic results, impressions and/or recommended diagnostic studies,  prognosis, risks and benefits of treatment options, instructions for treatment and/or follow-up, importance of compliance with chosen treatment options, risk-factor reduction, and patient/family education.         Pool therapy, walking in the pool, at least 3x per week for 30 minutes  Cervical x-ray ordered degenerative changes in the cervical spine with osteophyte and bridging at C6-7  Bilateral knee x-rays standing ordered showed end-stage bilateral knee arthritis  Continue tramadol 1 p.o. 4 times daily to take with acetaminophen 500 mg  Consider left C6-7 interlaminar EVE when patient calls  Consider bilateral knee Zilretta injections when patient calls  Healthy lifestyle and anti-inflammatory diet in addition to weight control discussed with the patient  Alternative chronic pain therapies was discussed, encouraged and information was handed  Return to Clinic 3 months       *Please note this report has been produced using speech recognition software and may contain errors related to that system including grammar, punctuation and spelling as well as words and phrases that may be inappropriate. If there are questions or concerns, please feel free to contact me to clarify.    Ced Yen MD

## 2024-02-15 ENCOUNTER — HOSPITAL ENCOUNTER (OUTPATIENT)
Dept: RADIOLOGY | Facility: HOSPITAL | Age: 74
Discharge: HOME | End: 2024-02-15
Payer: MEDICARE

## 2024-02-15 ENCOUNTER — OFFICE VISIT (OUTPATIENT)
Dept: PAIN MEDICINE | Facility: CLINIC | Age: 74
End: 2024-02-15
Payer: MEDICARE

## 2024-02-15 VITALS
DIASTOLIC BLOOD PRESSURE: 64 MMHG | WEIGHT: 190 LBS | RESPIRATION RATE: 16 BRPM | SYSTOLIC BLOOD PRESSURE: 128 MMHG | BODY MASS INDEX: 37.3 KG/M2 | TEMPERATURE: 97.5 F | HEIGHT: 60 IN | HEART RATE: 71 BPM

## 2024-02-15 DIAGNOSIS — M25.562 CHRONIC PAIN OF BOTH KNEES: ICD-10-CM

## 2024-02-15 DIAGNOSIS — G89.29 CHRONIC PAIN OF BOTH KNEES: ICD-10-CM

## 2024-02-15 DIAGNOSIS — M47.22 CERVICAL SPONDYLOSIS WITH RADICULOPATHY: ICD-10-CM

## 2024-02-15 DIAGNOSIS — M25.561 CHRONIC PAIN OF BOTH KNEES: ICD-10-CM

## 2024-02-15 DIAGNOSIS — M47.22 CERVICAL SPONDYLOSIS WITH RADICULOPATHY: Primary | ICD-10-CM

## 2024-02-15 DIAGNOSIS — F11.90 OPIOID USE: ICD-10-CM

## 2024-02-15 PROCEDURE — 4010F ACE/ARB THERAPY RXD/TAKEN: CPT | Performed by: ANESTHESIOLOGY

## 2024-02-15 PROCEDURE — 73565 X-RAY EXAM OF KNEES: CPT | Mod: BILATERAL PROCEDURE | Performed by: RADIOLOGY

## 2024-02-15 PROCEDURE — 1160F RVW MEDS BY RX/DR IN RCRD: CPT | Performed by: ANESTHESIOLOGY

## 2024-02-15 PROCEDURE — 73565 X-RAY EXAM OF KNEES: CPT

## 2024-02-15 PROCEDURE — 1125F AMNT PAIN NOTED PAIN PRSNT: CPT | Performed by: ANESTHESIOLOGY

## 2024-02-15 PROCEDURE — 3008F BODY MASS INDEX DOCD: CPT | Performed by: ANESTHESIOLOGY

## 2024-02-15 PROCEDURE — 3074F SYST BP LT 130 MM HG: CPT | Performed by: ANESTHESIOLOGY

## 2024-02-15 PROCEDURE — 99204 OFFICE O/P NEW MOD 45 MIN: CPT | Performed by: ANESTHESIOLOGY

## 2024-02-15 PROCEDURE — 1159F MED LIST DOCD IN RCRD: CPT | Performed by: ANESTHESIOLOGY

## 2024-02-15 PROCEDURE — 3078F DIAST BP <80 MM HG: CPT | Performed by: ANESTHESIOLOGY

## 2024-02-15 PROCEDURE — 1036F TOBACCO NON-USER: CPT | Performed by: ANESTHESIOLOGY

## 2024-02-15 PROCEDURE — 99214 OFFICE O/P EST MOD 30 MIN: CPT | Performed by: ANESTHESIOLOGY

## 2024-02-15 PROCEDURE — 72040 X-RAY EXAM NECK SPINE 2-3 VW: CPT

## 2024-02-15 PROCEDURE — 72040 X-RAY EXAM NECK SPINE 2-3 VW: CPT | Performed by: RADIOLOGY

## 2024-02-15 SDOH — ECONOMIC STABILITY: FOOD INSECURITY: WITHIN THE PAST 12 MONTHS, THE FOOD YOU BOUGHT JUST DIDN'T LAST AND YOU DIDN'T HAVE MONEY TO GET MORE.: NEVER TRUE

## 2024-02-15 SDOH — ECONOMIC STABILITY: FOOD INSECURITY: WITHIN THE PAST 12 MONTHS, YOU WORRIED THAT YOUR FOOD WOULD RUN OUT BEFORE YOU GOT MONEY TO BUY MORE.: NEVER TRUE

## 2024-02-15 ASSESSMENT — LIFESTYLE VARIABLES
HOW OFTEN DURING THE LAST YEAR HAVE YOU FAILED TO DO WHAT WAS NORMALLY EXPECTED FROM YOU BECAUSE OF DRINKING: NEVER
HAS A RELATIVE, FRIEND, DOCTOR, OR ANOTHER HEALTH PROFESSIONAL EXPRESSED CONCERN ABOUT YOUR DRINKING OR SUGGESTED YOU CUT DOWN: NO
HOW OFTEN DURING THE LAST YEAR HAVE YOU HAD A FEELING OF GUILT OR REMORSE AFTER DRINKING: NEVER
HOW MANY STANDARD DRINKS CONTAINING ALCOHOL DO YOU HAVE ON A TYPICAL DAY: PATIENT DOES NOT DRINK
HOW OFTEN DO YOU HAVE A DRINK CONTAINING ALCOHOL: NEVER
HAVE YOU OR SOMEONE ELSE BEEN INJURED AS A RESULT OF YOUR DRINKING: NO
HOW OFTEN DURING THE LAST YEAR HAVE YOU BEEN UNABLE TO REMEMBER WHAT HAPPENED THE NIGHT BEFORE BECAUSE YOU HAD BEEN DRINKING: NEVER
AUDIT TOTAL SCORE: 0
SKIP TO QUESTIONS 9-10: 1
AUDIT-C TOTAL SCORE: 0
HOW OFTEN DURING THE LAST YEAR HAVE YOU NEEDED AN ALCOHOLIC DRINK FIRST THING IN THE MORNING TO GET YOURSELF GOING AFTER A NIGHT OF HEAVY DRINKING: NEVER
HOW OFTEN DO YOU HAVE SIX OR MORE DRINKS ON ONE OCCASION: NEVER
HOW OFTEN DURING THE LAST YEAR HAVE YOU FOUND THAT YOU WERE NOT ABLE TO STOP DRINKING ONCE YOU HAD STARTED: NEVER
TOTAL SCORE: 0

## 2024-02-15 ASSESSMENT — PAIN - FUNCTIONAL ASSESSMENT: PAIN_FUNCTIONAL_ASSESSMENT: 0-10

## 2024-02-15 ASSESSMENT — ENCOUNTER SYMPTOMS
OCCASIONAL FEELINGS OF UNSTEADINESS: 1
LOSS OF SENSATION IN FEET: 0
DEPRESSION: 0

## 2024-02-15 ASSESSMENT — COLUMBIA-SUICIDE SEVERITY RATING SCALE - C-SSRS
2. HAVE YOU ACTUALLY HAD ANY THOUGHTS OF KILLING YOURSELF?: NO
6. HAVE YOU EVER DONE ANYTHING, STARTED TO DO ANYTHING, OR PREPARED TO DO ANYTHING TO END YOUR LIFE?: NO
1. IN THE PAST MONTH, HAVE YOU WISHED YOU WERE DEAD OR WISHED YOU COULD GO TO SLEEP AND NOT WAKE UP?: NO

## 2024-02-15 ASSESSMENT — PAIN SCALES - GENERAL
PAINLEVEL: 3
PAINLEVEL_OUTOF10: 3

## 2024-02-15 ASSESSMENT — PAIN DESCRIPTION - DESCRIPTORS: DESCRIPTORS: SHARP

## 2024-02-15 NOTE — PROGRESS NOTES
No chief complaint on file.    History of Present Complaint:  The patient was referred to us by Referring Provider: Mariah Mcdonald . this is 73 y.o.  female  with a past history of  obesity BMI 35, diabetes, hypertension, diabetes mellitus, chronic lymphedema of legs, arthritis, peptic ulcer disease, carcinoma of uterus s/p JACQUELINE/BSO, history of carcinoma of tongue s/p partial glossectomy, PUD, on tramadol x 4/day,  presenting with  chronic bilateral knee pain patient states that Tegan Sheth will not prescribe the tramadol that is why she was sent here.    Pain started 20 years  Pain is Better sitting and taking the tramadol worse when walking doing stairs .    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

## 2024-02-16 ASSESSMENT — ENCOUNTER SYMPTOMS
RESPIRATORY NEGATIVE: 1
CARDIOVASCULAR NEGATIVE: 1
NECK PAIN: 1
MYALGIAS: 1
HEMATOLOGIC/LYMPHATIC NEGATIVE: 1
BACK PAIN: 1
ENDOCRINE NEGATIVE: 1
NEUROLOGICAL NEGATIVE: 1
ARTHRALGIAS: 1
GASTROINTESTINAL NEGATIVE: 1
EYES NEGATIVE: 1
PSYCHIATRIC NEGATIVE: 1

## 2024-02-19 ENCOUNTER — TELEPHONE (OUTPATIENT)
Dept: PAIN MEDICINE | Facility: CLINIC | Age: 74
End: 2024-02-19
Payer: MEDICARE

## 2024-02-20 ENCOUNTER — APPOINTMENT (OUTPATIENT)
Dept: PRIMARY CARE | Facility: CLINIC | Age: 74
End: 2024-02-20
Payer: MEDICARE

## 2024-02-20 ENCOUNTER — APPOINTMENT (OUTPATIENT)
Dept: OTOLARYNGOLOGY | Facility: CLINIC | Age: 74
End: 2024-02-20
Payer: MEDICARE

## 2024-02-21 ENCOUNTER — TELEPHONE (OUTPATIENT)
Dept: PAIN MEDICINE | Facility: CLINIC | Age: 74
End: 2024-02-21
Payer: MEDICARE

## 2024-02-21 DIAGNOSIS — M17.10 PRIMARY OSTEOARTHRITIS OF KNEE, UNSPECIFIED LATERALITY: Primary | ICD-10-CM

## 2024-02-21 NOTE — TELEPHONE ENCOUNTER
----- Message from Nella Pruitt sent at 2/21/2024 12:44 PM EST -----  Ok, can you please enter the order for that.  Thank you  ----- Message -----  From: Ced Yen MD  Sent: 2/21/2024  12:32 PM EST  To: Nella Pruitt    The patient told me that she had injections from orthopedic surgeons that they stopped working which is steroid usually.  You can schedule her for Depo-Medrol injections 80 mg each knee thanks  ----- Message -----  From: Nella Pruitt  Sent: 2/21/2024  11:03 AM EST  To: Ced Yen MD    You have this patient scheduled for Zilretta for both knees.  I submitted the auth request but her insurance will not approve it at this time. There has to be a trial of depomedrol or kenalog first. If that fails, we can request the zilretta.  Just let me know if you want to change injection. thx

## 2024-02-26 DIAGNOSIS — M25.561 CHRONIC PAIN OF BOTH KNEES: ICD-10-CM

## 2024-02-26 DIAGNOSIS — M25.562 CHRONIC PAIN OF BOTH KNEES: ICD-10-CM

## 2024-02-26 DIAGNOSIS — G89.29 CHRONIC PAIN OF BOTH KNEES: ICD-10-CM

## 2024-02-27 ENCOUNTER — TELEPHONE (OUTPATIENT)
Dept: PRIMARY CARE | Facility: CLINIC | Age: 74
End: 2024-02-27
Payer: MEDICARE

## 2024-02-27 NOTE — TELEPHONE ENCOUNTER
Prescription refill request    Glyburide-metformin  90 day supply    Patient takes two tablets in the morning and two tablets at night.    Pharmacy: [facility]

## 2024-02-28 RX ORDER — TRAMADOL HYDROCHLORIDE 50 MG/1
100 TABLET ORAL 2 TIMES DAILY
Qty: 120 TABLET | Refills: 0 | Status: SHIPPED | OUTPATIENT
Start: 2024-02-28

## 2024-02-28 RX ORDER — NALOXONE HYDROCHLORIDE 4 MG/.1ML
4 SPRAY NASAL AS NEEDED
Qty: 2 EACH | Refills: 0 | Status: SHIPPED | OUTPATIENT
Start: 2024-02-28

## 2024-02-28 NOTE — TELEPHONE ENCOUNTER
Lov 02/15/24, fukelsey 03/04/24. No contracts    Primary would like you take over the script for 90 days.

## 2024-03-01 DIAGNOSIS — I10 ESSENTIAL (PRIMARY) HYPERTENSION: ICD-10-CM

## 2024-03-01 DIAGNOSIS — E11.42 TYPE 2 DIABETES MELLITUS WITH POLYNEUROPATHY (MULTI): ICD-10-CM

## 2024-03-01 RX ORDER — GLIPIZIDE AND METFORMIN HCL 2.5; 5 MG/1; MG/1
2 TABLET, FILM COATED ORAL
Qty: 360 TABLET | Refills: 3 | Status: SHIPPED | OUTPATIENT
Start: 2024-03-01 | End: 2025-03-01

## 2024-03-01 RX ORDER — LISINOPRIL 10 MG/1
10 TABLET ORAL DAILY
Qty: 90 TABLET | Refills: 0 | OUTPATIENT
Start: 2024-03-01

## 2024-03-01 RX ORDER — LISINOPRIL 10 MG/1
10 TABLET ORAL DAILY
Qty: 90 TABLET | Refills: 0 | Status: SHIPPED | OUTPATIENT
Start: 2024-03-01 | End: 2024-05-28

## 2024-03-04 ENCOUNTER — HOSPITAL ENCOUNTER (OUTPATIENT)
Dept: RADIOLOGY | Facility: CLINIC | Age: 74
Discharge: HOME | End: 2024-03-04
Payer: MEDICARE

## 2024-03-04 ENCOUNTER — APPOINTMENT (OUTPATIENT)
Dept: RADIOLOGY | Facility: CLINIC | Age: 74
End: 2024-03-04
Payer: MEDICARE

## 2024-03-04 ENCOUNTER — APPOINTMENT (OUTPATIENT)
Dept: PAIN MEDICINE | Facility: CLINIC | Age: 74
End: 2024-03-04
Payer: MEDICARE

## 2024-03-04 ENCOUNTER — HOSPITAL ENCOUNTER (OUTPATIENT)
Dept: PAIN MEDICINE | Facility: CLINIC | Age: 74
Discharge: HOME | End: 2024-03-04
Payer: MEDICARE

## 2024-03-04 VITALS
HEART RATE: 74 BPM | RESPIRATION RATE: 16 BRPM | SYSTOLIC BLOOD PRESSURE: 174 MMHG | OXYGEN SATURATION: 98 % | TEMPERATURE: 98.4 F | DIASTOLIC BLOOD PRESSURE: 59 MMHG

## 2024-03-04 DIAGNOSIS — M47.22 CERVICAL SPONDYLOSIS WITH RADICULOPATHY: ICD-10-CM

## 2024-03-04 DIAGNOSIS — M25.561 CHRONIC PAIN OF BOTH KNEES: ICD-10-CM

## 2024-03-04 DIAGNOSIS — G89.29 CHRONIC PAIN OF BOTH KNEES: ICD-10-CM

## 2024-03-04 DIAGNOSIS — M25.562 CHRONIC PAIN OF BOTH KNEES: ICD-10-CM

## 2024-03-04 DIAGNOSIS — M17.10 PRIMARY OSTEOARTHRITIS OF KNEE, UNSPECIFIED LATERALITY: ICD-10-CM

## 2024-03-04 DIAGNOSIS — F11.90 OPIOID USE: ICD-10-CM

## 2024-03-04 PROCEDURE — 20611 DRAIN/INJ JOINT/BURSA W/US: CPT | Performed by: ANESTHESIOLOGY

## 2024-03-04 PROCEDURE — 20611 DRAIN/INJ JOINT/BURSA W/US: CPT | Mod: 50 | Performed by: ANESTHESIOLOGY

## 2024-03-04 PROCEDURE — 2500000004 HC RX 250 GENERAL PHARMACY W/ HCPCS (ALT 636 FOR OP/ED)

## 2024-03-04 PROCEDURE — 2500000005 HC RX 250 GENERAL PHARMACY W/O HCPCS

## 2024-03-04 RX ORDER — LIDOCAINE HYDROCHLORIDE 5 MG/ML
INJECTION, SOLUTION INFILTRATION; INTRAVENOUS
Status: COMPLETED
Start: 2024-03-04 | End: 2024-03-04

## 2024-03-04 RX ORDER — BUPIVACAINE HYDROCHLORIDE 7.5 MG/ML
INJECTION, SOLUTION EPIDURAL; RETROBULBAR
Status: COMPLETED
Start: 2024-03-04 | End: 2024-03-04

## 2024-03-04 RX ORDER — METHYLPREDNISOLONE ACETATE 80 MG/ML
INJECTION, SUSPENSION INTRA-ARTICULAR; INTRALESIONAL; INTRAMUSCULAR; SOFT TISSUE
Status: COMPLETED
Start: 2024-03-04 | End: 2024-03-04

## 2024-03-04 RX ADMIN — BUPIVACAINE HYDROCHLORIDE 75 MG: 7.5 INJECTION, SOLUTION EPIDURAL; RETROBULBAR at 14:54

## 2024-03-04 RX ADMIN — METHYLPREDNISOLONE ACETATE 80 MG: 80 INJECTION, SUSPENSION INTRA-ARTICULAR; INTRALESIONAL; INTRAMUSCULAR; SOFT TISSUE at 14:55

## 2024-03-04 RX ADMIN — LIDOCAINE HYDROCHLORIDE 250 MG: 5 INJECTION, SOLUTION INFILTRATION at 14:55

## 2024-03-04 ASSESSMENT — COLUMBIA-SUICIDE SEVERITY RATING SCALE - C-SSRS
2. HAVE YOU ACTUALLY HAD ANY THOUGHTS OF KILLING YOURSELF?: NO
1. IN THE PAST MONTH, HAVE YOU WISHED YOU WERE DEAD OR WISHED YOU COULD GO TO SLEEP AND NOT WAKE UP?: NO
6. HAVE YOU EVER DONE ANYTHING, STARTED TO DO ANYTHING, OR PREPARED TO DO ANYTHING TO END YOUR LIFE?: NO

## 2024-03-04 ASSESSMENT — ENCOUNTER SYMPTOMS
LOSS OF SENSATION IN FEET: 0
OCCASIONAL FEELINGS OF UNSTEADINESS: 1
DEPRESSION: 0

## 2024-03-04 ASSESSMENT — PAIN SCALES - GENERAL: PAINLEVEL_OUTOF10: 8

## 2024-03-04 ASSESSMENT — PAIN - FUNCTIONAL ASSESSMENT: PAIN_FUNCTIONAL_ASSESSMENT: 0-10

## 2024-03-04 NOTE — OP NOTE
Pre-op diagnosis: Knee arthritis  Postoperative diagnosis: The same    Procedure: bilateral Depo-Medrol 80 mg each knee injection under fluoroscopy guidance.  Blood loss: 0 mL  Complications: None    Ready to learn, no apparent learning barriers.  Explained treatment plan. Pt. verbalized understanding. Following review of potential side effects and complications (including but not necessarily limited to infection, allergic reaction, local tissue breakdown, injury to bodily tissues. The patient expressed understanding of this risk and wishes to proceed with the elective procedure.    The patient was brought to the operating room and placed in the supine position with pillows underneath the right knees..The procedure was carried out under sterile prep with Chlora-prep.  Under fluoroscopy guidance the skin was infiltrated with lidocaine 0.5% using 25 ga 1.5 inche  needle and size 18-gauge 1.5 inch needle was introduced and advanced into the lateral/caudal area of the patella into the ligament then the knee joint and after negative aspiration 0.5 cc of Omnipaque 300 was injected and showed excellent distribution of the dye into the joint cavity.  Next Depo-Medrol 80 mg + 2 cc of 0.75 bupivacaine were injected into the joint.  The needle was flushed and removed.     The same procedure technique and medication dosages were repeated at  the left knee but 10 cc of synovial fluid was aspirated and discarded.    Patient tolerated the procedure very well and discharged to the recovery room in stable condition.

## 2024-03-05 ENCOUNTER — TELEPHONE (OUTPATIENT)
Dept: OBSTETRICS AND GYNECOLOGY | Facility: CLINIC | Age: 74
End: 2024-03-05
Payer: MEDICARE

## 2024-03-05 NOTE — TELEPHONE ENCOUNTER
Patient requesting medication refill for     Memantine 10 mg       [facility]    Patient stated she is out of her medication.

## 2024-03-13 ENCOUNTER — APPOINTMENT (OUTPATIENT)
Dept: INTEGRATIVE MEDICINE | Facility: CLINIC | Age: 74
End: 2024-03-13
Payer: MEDICARE

## 2024-04-25 ENCOUNTER — TELEPHONE (OUTPATIENT)
Dept: PAIN MEDICINE | Facility: CLINIC | Age: 74
End: 2024-04-25
Payer: MEDICARE

## 2024-04-25 NOTE — TELEPHONE ENCOUNTER
Eneida from [facility] called regarding the [facility] referrals placed for this patient and they are asking if these are medically necessary because if not they are not covered by [facility].  They are requesting a call back at [phone].

## 2024-04-26 NOTE — TELEPHONE ENCOUNTER
Called [facility] spoke with representative Gracia.  Call ref # [id number].    Informed Therapies medically necessary representative said she would put it through

## 2024-05-17 ENCOUNTER — TELEPHONE (OUTPATIENT)
Dept: PRIMARY CARE | Facility: CLINIC | Age: 74
End: 2024-05-17
Payer: MEDICARE

## 2024-05-18 DIAGNOSIS — F03.A0 MILD DEMENTIA WITHOUT BEHAVIORAL DISTURBANCE, PSYCHOTIC DISTURBANCE, MOOD DISTURBANCE, OR ANXIETY, UNSPECIFIED DEMENTIA TYPE (MULTI): Primary | ICD-10-CM

## 2024-05-18 RX ORDER — MEMANTINE HYDROCHLORIDE 10 MG/1
10 TABLET ORAL 2 TIMES DAILY
Qty: 60 TABLET | Refills: 3 | Status: SHIPPED | OUTPATIENT
Start: 2024-05-18

## 2024-05-31 ENCOUNTER — TELEPHONE (OUTPATIENT)
Dept: PAIN MEDICINE | Facility: CLINIC | Age: 74
End: 2024-05-31
Payer: MEDICARE

## 2024-08-13 ENCOUNTER — TELEPHONE (OUTPATIENT)
Dept: PRIMARY CARE | Facility: CLINIC | Age: 74
End: 2024-08-13
Payer: MEDICARE

## 2024-08-14 ENCOUNTER — TELEPHONE (OUTPATIENT)
Dept: HEMATOLOGY/ONCOLOGY | Facility: CLINIC | Age: 74
End: 2024-08-14
Payer: MEDICARE

## 2024-08-14 DIAGNOSIS — D64.9 ANEMIA, UNSPECIFIED TYPE: ICD-10-CM

## 2024-08-14 DIAGNOSIS — E53.8 VITAMIN B12 DEFICIENCY: ICD-10-CM

## 2024-08-14 NOTE — TELEPHONE ENCOUNTER
Patient requesting B12 1000 mcg refill be sent to [facility] at [facility]

## 2024-08-15 DIAGNOSIS — E53.8 VITAMIN B12 DEFICIENCY: Primary | ICD-10-CM

## 2024-08-15 RX ORDER — ZINC GLUCONATE 50 MG
1000 TABLET ORAL DAILY
Qty: 90 TABLET | Refills: 3 | Status: SHIPPED | OUTPATIENT
Start: 2024-08-15

## 2024-09-10 ENCOUNTER — OFFICE VISIT (OUTPATIENT)
Dept: HEMATOLOGY/ONCOLOGY | Facility: CLINIC | Age: 74
End: 2024-09-10
Payer: MEDICARE

## 2024-09-10 ENCOUNTER — LAB (OUTPATIENT)
Dept: LAB | Facility: CLINIC | Age: 74
End: 2024-09-10
Payer: MEDICARE

## 2024-09-10 VITALS
WEIGHT: 176.81 LBS | DIASTOLIC BLOOD PRESSURE: 54 MMHG | OXYGEN SATURATION: 95 % | TEMPERATURE: 98.1 F | SYSTOLIC BLOOD PRESSURE: 120 MMHG | RESPIRATION RATE: 18 BRPM | HEART RATE: 77 BPM | BODY MASS INDEX: 34.53 KG/M2

## 2024-09-10 DIAGNOSIS — K92.1 MELENA: ICD-10-CM

## 2024-09-10 DIAGNOSIS — E53.8 VITAMIN B12 DEFICIENCY: ICD-10-CM

## 2024-09-10 DIAGNOSIS — E53.8 VITAMIN B12 DEFICIENCY: Primary | ICD-10-CM

## 2024-09-10 DIAGNOSIS — D64.9 ANEMIA, UNSPECIFIED TYPE: ICD-10-CM

## 2024-09-10 LAB
ALBUMIN SERPL BCP-MCNC: 4.1 G/DL (ref 3.4–5)
ALP SERPL-CCNC: 64 U/L (ref 33–136)
ALT SERPL W P-5'-P-CCNC: 7 U/L (ref 7–45)
ANION GAP SERPL CALC-SCNC: 13 MMOL/L (ref 10–20)
AST SERPL W P-5'-P-CCNC: 12 U/L (ref 9–39)
BASOPHILS # BLD AUTO: 0.05 X10*3/UL (ref 0–0.1)
BASOPHILS NFR BLD AUTO: 0.8 %
BILIRUB SERPL-MCNC: 0.5 MG/DL (ref 0–1.2)
BUN SERPL-MCNC: 13 MG/DL (ref 6–23)
CALCIUM SERPL-MCNC: 9.3 MG/DL (ref 8.6–10.3)
CHLORIDE SERPL-SCNC: 103 MMOL/L (ref 98–107)
CO2 SERPL-SCNC: 28 MMOL/L (ref 21–32)
CREAT SERPL-MCNC: 1.3 MG/DL (ref 0.5–1.05)
EGFRCR SERPLBLD CKD-EPI 2021: 43 ML/MIN/1.73M*2
EOSINOPHIL # BLD AUTO: 0.17 X10*3/UL (ref 0–0.4)
EOSINOPHIL NFR BLD AUTO: 2.7 %
ERYTHROCYTE [DISTWIDTH] IN BLOOD BY AUTOMATED COUNT: 13.2 % (ref 11.5–14.5)
FERRITIN SERPL-MCNC: 33 NG/ML (ref 8–150)
GLUCOSE SERPL-MCNC: 85 MG/DL (ref 74–99)
HCT VFR BLD AUTO: 40.3 % (ref 36–46)
HGB BLD-MCNC: 13 G/DL (ref 12–16)
IMM GRANULOCYTES # BLD AUTO: 0.03 X10*3/UL (ref 0–0.5)
IMM GRANULOCYTES NFR BLD AUTO: 0.5 % (ref 0–0.9)
IRON SATN MFR SERPL: 14 % (ref 25–45)
IRON SERPL-MCNC: 50 UG/DL (ref 35–150)
LYMPHOCYTES # BLD AUTO: 1.18 X10*3/UL (ref 0.8–3)
LYMPHOCYTES NFR BLD AUTO: 18.7 %
MCH RBC QN AUTO: 29.1 PG (ref 26–34)
MCHC RBC AUTO-ENTMCNC: 32.3 G/DL (ref 32–36)
MCV RBC AUTO: 90 FL (ref 80–100)
MONOCYTES # BLD AUTO: 0.32 X10*3/UL (ref 0.05–0.8)
MONOCYTES NFR BLD AUTO: 5.1 %
NEUTROPHILS # BLD AUTO: 4.55 X10*3/UL (ref 1.6–5.5)
NEUTROPHILS NFR BLD AUTO: 72.2 %
NRBC BLD-RTO: 0 /100 WBCS (ref 0–0)
PLATELET # BLD AUTO: 312 X10*3/UL (ref 150–450)
POTASSIUM SERPL-SCNC: 4.3 MMOL/L (ref 3.5–5.3)
PROT SERPL-MCNC: 6.8 G/DL (ref 6.4–8.2)
RBC # BLD AUTO: 4.47 X10*6/UL (ref 4–5.2)
SODIUM SERPL-SCNC: 140 MMOL/L (ref 136–145)
TIBC SERPL-MCNC: 361 UG/DL (ref 240–445)
UIBC SERPL-MCNC: 311 UG/DL (ref 110–370)
WBC # BLD AUTO: 6.3 X10*3/UL (ref 4.4–11.3)

## 2024-09-10 PROCEDURE — 99213 OFFICE O/P EST LOW 20 MIN: CPT | Performed by: INTERNAL MEDICINE

## 2024-09-10 PROCEDURE — 1036F TOBACCO NON-USER: CPT | Performed by: INTERNAL MEDICINE

## 2024-09-10 PROCEDURE — 83540 ASSAY OF IRON: CPT

## 2024-09-10 PROCEDURE — 1125F AMNT PAIN NOTED PAIN PRSNT: CPT | Performed by: INTERNAL MEDICINE

## 2024-09-10 PROCEDURE — 85025 COMPLETE CBC W/AUTO DIFF WBC: CPT

## 2024-09-10 PROCEDURE — 82728 ASSAY OF FERRITIN: CPT | Mod: PARLAB

## 2024-09-10 PROCEDURE — 36415 COLL VENOUS BLD VENIPUNCTURE: CPT

## 2024-09-10 PROCEDURE — 3074F SYST BP LT 130 MM HG: CPT | Performed by: INTERNAL MEDICINE

## 2024-09-10 PROCEDURE — 1159F MED LIST DOCD IN RCRD: CPT | Performed by: INTERNAL MEDICINE

## 2024-09-10 PROCEDURE — 3078F DIAST BP <80 MM HG: CPT | Performed by: INTERNAL MEDICINE

## 2024-09-10 PROCEDURE — 80053 COMPREHEN METABOLIC PANEL: CPT

## 2024-09-10 PROCEDURE — 4010F ACE/ARB THERAPY RXD/TAKEN: CPT | Performed by: INTERNAL MEDICINE

## 2024-09-10 ASSESSMENT — PAIN SCALES - GENERAL: PAINLEVEL: 4

## 2024-09-10 NOTE — PROGRESS NOTES
KORY HANSON is a 73 year old Female        Interval History:    hematuria.  present illness:      The patient is a 73-year-old woman with past medical history of hypertension, diabetes mellitus, chronic lymphedema of legs, arthritis, peptic ulcer disease, carcinoma of uterus s/p JACQUELINE/BSO, history of carcinoma of tongue s/p partial glossectomy.  Routine  CBC on 2023 revealed WBC 8.2 hemoglobin 9.9 g/dL MCV 81 platelets 378 1000/mm³.  Differential count revealed 74% neutrophils 16% lymphocytes 6% monocytes 3% eosinophils 1% basophils vitamin B12 level of 155 PG per mL creatinine 1.38 mg/dL  serum iron 22 mcg/dL, TIBC 462 mcg/dL iron saturation 5% ferritin 11 mcg/L.  The patient was referred for further evaluation and management.         At interview on 2023 the patient narrated entire history.  She denied history of weight loss, night sweats, chest pain, shortness of breath at rest, nausea, vomiting, hematemesis, melena, hematochezia and hematuria.      The patient had come for follow-up on September 10, 2024 regarding history of complex and multifactorial anemia.  The patient complains of feeling profoundly weak and tired and is requesting assistance.     Past medical history, hypertension, diabetes mellitus, elevated creatinine, peptic ulcer disease, arthritis, carcinoma of uterus, s/p JACQUELINE/BSO, history of tongue cancer status post partial glossectomy.      Past surgical history: History of JACQUELINE/BSO did not receive chemo or radiation therapy      S/p partial glossectomy did not receive chemo or radiation therapy.      Mammogram: 2 years ago.      Never had a colonoscopy.      Family history:      Mother  of lung cancer      Father  of MI.      Personal history and social history:      73 years old, , has 2 children.  Worked in market research.  Smoked 1-1/2 packs/day for 50 years quit 15 years ago no history of alcohol abuse drug abuse     Review of Systems:   ·  System Review All  other systems have been reviewed and are negative for complaint.            Allergies and Intolerances:       Allergies:         NKDA: Active         contrast (specific type unknown): Contrast, Swelling/Edema, Active         Strawberry: Food, Hives/Urticaria, Active     Outpatient Medication Profile:  * Patient Currently Takes Medications as of 14-Aug-2023 15:00 documented in Structured Notes         pantoprazole 40 mg oral delayed release  tablet: Last Dose Taken:  , 1 tab(s) orally 2 times a day, Start Date: 18-Jun-2021         docusate sodium 100 mg oral capsule: Last Dose Taken:  , 1 cap(s) orally  2 times a day, Start Date: 18-Jun-2021         bisacodyl 10 mg rectal suppository: Last Dose Taken:  , 1 suppository(ies)  rectal once, Start Date: 18-Jun-2021         lisinopril 10 mg oral tablet: Last Dose Taken:  , 1 tab(s) orally once  a day, Start Date: 18-Jun-2021         acetaminophen 325 mg oral tablet: Last Dose Taken:  , 2 tab(s) orally  every 4 hours, As needed, Headache, Start Date: 18-Jun-2021         lisinopril 10 mg oral tablet: Last Dose Taken:  , Crush 1 tab(s) in applesauce  and take orally once a day (in the morning)         glyBURIDE-metFORMIN 2.5 mg-500 mg oral tablet: Last Dose Taken:  , Crush  2 tab(s) in applesauce and take orally 2 times a day         clotrimazole-betamethasone dipropionate 1%-0.05% topical cream: Last  Dose Taken:  , Apply topically to lower abdomen/groin area once a day         memantine 10 mg oral tablet: Last Dose Taken:  , Crush 1 tab(s) in applesauce  and take orally 2 times a day         traMADol 50 mg oral tablet: Last Dose Taken:  , Crush 2 tab(s) in applesauce  and take orally 2 times a day        Family History: No Family History items are recorded  in the problem list.      Social History:   Social Substance History:  ·  Social History denies smoking, alcohol and drug use   ·  Smoking Status former smoker (1)   ·  Tobacco Use denies   ·  Alcohol Use denies   ·   Drug Use denies            Vitals and Measurements:   Vitals: Temp: 36.5  HR: 74  RR: 18  BP: 130/61  SPO2%:   99   Measurements: HT(cm): 158  WT(kg): 84.2  BSA: 1.92   BMI:  33.7      Physical Exam:      Constitutional: Well developed, awake/alert/oriented  x3, no distress, alert and cooperative   Eyes: PERRL, EOMI, clear sclera   ENMT: mucous membranes moist, no apparent injury,  no lesions seen   Head/Neck: Neck supple, no apparent injury, thyroid  without mass or tenderness, No JVD, trachea midline, no bruits   Respiratory/Thorax: Patent airways, CTAB, normal  breath sounds with good chest expansion, thorax symmetric   Cardiovascular: Regular, rate and rhythm, no murmurs,  2+ equal pulses of the extremities, normal S 1and S 2   Gastrointestinal: Nondistended, soft, non-tender,  no rebound tenderness or guarding, no masses palpable, no organomegaly, +BS, no bruits   Genitourinary: No Discharge, vesicles or other abnormalities   Musculoskeletal: ROM intact, no joint swelling, normal  strength   Extremities: normal extremities, no cyanosis , contusions  or wounds, no clubbing  Bilateral lower extremity lymphedema   Neurological: alert and oriented x3, intact senses,  motor, response and reflexes, normal strength   Breast: No masses, tenderness, no discharge or discoloration   Lymphatic: No significant lymphadenopathy   Psychological: Appropriate mood and behavior   Skin: Warm and dry, no lesions, no rashes         Lab Results:           Assessment and Plan:         The patient is a 73-year-old woman with past medical history of hypertension, diabetes mellitus, chronic lymphedema of legs, arthritis, peptic ulcer disease, carcinoma of uterus s/p JACQUELINE/BSO, history of carcinoma of tongue s/p partial glossectomy.  Routine  CBC on July 12, 2023 revealed WBC 8.2 hemoglobin 9.9 g/dL MCV 81 platelets 378 1000/mm³.  Differential count revealed 74% neutrophils 16% lymphocytes 6% monocytes 3% eosinophils 1% basophils vitamin  B12 level of 155 PG per mL creatinine 1.38 mg/dL  serum iron 22 mcg/dL, TIBC 462 mcg/dL iron saturation 5% ferritin 11 mcg/L.  The patient was referred for further evaluation and management.      I had a detailed discussion with the patient and explained to her about iron and vitamin B12 metabolism.  The patient has vitamin B12 as well as iron deficiency.  Physical examination revealed bilateral lower extremity lymphedema and conjunctival pallor.   It would be prudent to rule out other deficiencies such as folate and TSH levels.  Once all the information is available we will make further recommendations.  The patient understood appreciated all the details provided and was grateful.     The patient had come for follow-up on August 14, 2023 regarding history of multifactorial and complex anemia.  The patient is symptomatic and complains of feeling profoundly weak and tired.      Iron deficiency anemia.  On August 14, 2023.  I have recommended intravenous Feraheme 510 mg/week x 2 weeks.      Vitamin B12 deficiency: Level of 153 PG per mL on August 14, 2023.  Antiparietal cell antibody, and intrinsic factor antibodies were negative.  Recommend vitamin B12 1000 mcg subcu daily for 5 days, then q. weekly x4 weeks, and then every 4 weeks there  after.  Recommend starting vitamin B12 and iron replacement therapy at the same time.     Vitamin D level of 10 NG per mL on August 14, 2023 recommend vitamin D 2, 50,000 units by mouth once a week for 12 weeks followed by over-the-counter vitamin D3 1000 units/day there after.    The patient had come for follow-up on 9/10/24 regarding history of iron deficiency anemia and vitamin B12 deficiency anemia.  Patient received both vitamin B12 and iron replacement therapy in August 2023.  She is feeling much better.  Hemoglobin improved up to 13.0 g/dL.  Iron indicis vitamin B12 in reference range.    The patient to return in 6 months.     Peptic ulcer disease:      Protonix 40 mg p.o.  daily.      Hypertension:      Lisinopril 10 mg p.o. daily.      Diabetes mellitus:      Glyburide/metformin 2.5 mg / 500 mg p.o. twice daily.

## 2024-09-25 ENCOUNTER — OFFICE VISIT (OUTPATIENT)
Dept: PODIATRY | Facility: CLINIC | Age: 74
End: 2024-09-25
Payer: MEDICARE

## 2024-09-25 DIAGNOSIS — M79.671 PAIN IN BOTH FEET: ICD-10-CM

## 2024-09-25 DIAGNOSIS — I89.0 LYMPHEDEMA OF BOTH LOWER EXTREMITIES: ICD-10-CM

## 2024-09-25 DIAGNOSIS — M79.672 PAIN IN BOTH FEET: ICD-10-CM

## 2024-09-25 DIAGNOSIS — M72.2 PLANTAR FASCIITIS OF LEFT FOOT: Primary | ICD-10-CM

## 2024-09-25 DIAGNOSIS — B35.1 ONYCHOMYCOSIS: ICD-10-CM

## 2024-09-25 DIAGNOSIS — E11.49 TYPE II DIABETES MELLITUS WITH NEUROLOGICAL MANIFESTATIONS (MULTI): ICD-10-CM

## 2024-09-25 PROCEDURE — 1159F MED LIST DOCD IN RCRD: CPT | Performed by: PODIATRIST

## 2024-09-25 PROCEDURE — 4010F ACE/ARB THERAPY RXD/TAKEN: CPT | Performed by: PODIATRIST

## 2024-09-25 PROCEDURE — 99214 OFFICE O/P EST MOD 30 MIN: CPT | Performed by: PODIATRIST

## 2024-09-25 PROCEDURE — 1036F TOBACCO NON-USER: CPT | Performed by: PODIATRIST

## 2024-09-25 PROCEDURE — 1160F RVW MEDS BY RX/DR IN RCRD: CPT | Performed by: PODIATRIST

## 2024-09-25 PROCEDURE — 99204 OFFICE O/P NEW MOD 45 MIN: CPT | Performed by: PODIATRIST

## 2024-09-25 NOTE — PROGRESS NOTES
"Chief Complaint   Patient presents with    DM Foot Care     New Patient is here today for diabetic foot care.    HPI:  C/O painful elongated toenails that limit walking.  Patient is requesting debridement she is unable to reach her nails and trim them herself.  Patient is diabetic.  Glucose controlled.  She does not check it every day. Denies neuropathic symptoms.  C/O plantar fasciitis pain L x years.  Stretching exercises, has not been doing them.  No post-static dyskinesia.  Nocturnal pain \"keeps me up\". Takes tramadol for her knees.  Patient states that the heel is her most concerning complaint at today's visit.  Patient also states that she is in a hurry and needs to catch her transportation home.  H/O GI ulcer.  H/O lymphedema, does not wear compression hose or use pumps.    PMH, PSx, Medications and allergies reviewed.  ROS negative except for what is stated in HPI.    Physical Exam  Patient alert, oriented, no acute distress  Respiratory: non labored breathing  Psychiatric: Mood and affect normal/baseline  HEENT: sclera clear    VASC: +2/4  DP pulses B/L.  Unable to palpate PT pulses bilaterally secondary to edema.  CFT brisk all digits.  Skin temperature is warm to warm proximal distal B/L.  (+)hair growth B/L.   LE edema consistent with lymphedema L>R.  No weeping, no bulla noted B/L.    NEURO: Vibratory diminished 1st MPJ B/L.  Light touch intact B/L.   5.07 [place] monofilament intact B/L.    DERM:Nails >6 are thickened, discolored, crumbly, painful and elongated with subungual debris. Pain on palpation to the nails. No cellulitis noted.  No ulcers noted B/L.     MUSCULOSKEL: +5/5 muscle strength B/L.    Decreased ankle joint ROM B/L.  Pes planus noted B/L  Fat pad atrophy noted B/L  No pain with lateral compression of calcaneus  Mild pain upon palpation of the medial tubercle of the calcaneus and on the Achilles insertion left  Windlass mechanism is intact B/L    Lab Results   Component Value " Date    HGBA1C 6.3 (A) 07/12/2023      Assessment and Plan  #1  Onychomycosis  Debrided all nails in length and thickness  Follow-up 2 months  Can discuss fungal nail treatments at a follow-up visit    #2 Planter fasciitis of left heel  Patient to continue in supportive shoe gear  Patient instructed on calf stretches to do this throughout the day  Patient can ice the painful areas of the heel 20 minutes on, 20 minutes off  Rx: Topical combination Planter fasciitis cream  Rx: X-rays left foot, patient can have this done before her next visit  Follow-up 4 weeks    #3 DM with peripheral neuropathy  Continue to control glucose  Continue to avoid barefoot walking  Follow-up yearly

## 2024-09-30 RX ORDER — ZINC GLUCONATE 50 MG
1000 TABLET ORAL DAILY
Qty: 30 TABLET | Refills: 0 | Status: SHIPPED | OUTPATIENT
Start: 2024-09-30

## 2024-10-11 ENCOUNTER — TELEPHONE (OUTPATIENT)
Dept: OBSTETRICS AND GYNECOLOGY | Facility: HOSPITAL | Age: 74
End: 2024-10-11
Payer: MEDICARE

## 2024-10-11 DIAGNOSIS — F03.A0 MILD DEMENTIA WITHOUT BEHAVIORAL DISTURBANCE, PSYCHOTIC DISTURBANCE, MOOD DISTURBANCE, OR ANXIETY, UNSPECIFIED DEMENTIA TYPE: ICD-10-CM

## 2024-10-19 RX ORDER — MEMANTINE HYDROCHLORIDE 10 MG/1
10 TABLET ORAL 2 TIMES DAILY
Qty: 60 TABLET | Refills: 0 | Status: SHIPPED | OUTPATIENT
Start: 2024-10-19

## 2024-10-24 ENCOUNTER — APPOINTMENT (OUTPATIENT)
Dept: PODIATRY | Facility: CLINIC | Age: 74
End: 2024-10-24
Payer: MEDICARE

## 2024-11-21 ENCOUNTER — OFFICE VISIT (OUTPATIENT)
Dept: PODIATRY | Facility: CLINIC | Age: 74
End: 2024-11-21
Payer: MEDICARE

## 2024-11-21 DIAGNOSIS — M72.2 PLANTAR FASCIITIS OF LEFT FOOT: Primary | ICD-10-CM

## 2024-11-21 DIAGNOSIS — E11.49 TYPE II DIABETES MELLITUS WITH NEUROLOGICAL MANIFESTATIONS (MULTI): ICD-10-CM

## 2024-11-21 DIAGNOSIS — M79.672 PAIN IN BOTH FEET: ICD-10-CM

## 2024-11-21 DIAGNOSIS — M79.671 PAIN IN BOTH FEET: ICD-10-CM

## 2024-11-21 PROCEDURE — 4010F ACE/ARB THERAPY RXD/TAKEN: CPT | Performed by: PODIATRIST

## 2024-11-21 PROCEDURE — 1036F TOBACCO NON-USER: CPT | Performed by: PODIATRIST

## 2024-11-21 PROCEDURE — 1160F RVW MEDS BY RX/DR IN RCRD: CPT | Performed by: PODIATRIST

## 2024-11-21 PROCEDURE — 1159F MED LIST DOCD IN RCRD: CPT | Performed by: PODIATRIST

## 2024-11-21 PROCEDURE — 99213 OFFICE O/P EST LOW 20 MIN: CPT | Performed by: PODIATRIST

## 2024-11-21 NOTE — PROGRESS NOTES
Chief Complaint   Patient presents with    DM Foot Care     Pt here today for DM nail care    HPI:  C/O painful elongated toenails that limit walking.  Patient is requesting debridement she is unable to reach her nails and trim them herself.  Glucose is controlled.  Patient is requesting a new prescription for diabetic shoes and inserts.  F/U plantar fasciitis pain L.  Nocturnal pain in L heel remains.  Patient was unable to receive topical combination cream secondary to cost.  Patient did not have x-rays taken.  States that she has to leave quickly to catch her ride home.    Physical Exam  Patient alert, oriented, no acute distress  Respiratory: non labored breathing  Psychiatric: Mood and affect normal/baseline  HEENT: sclera clear    VASC: +2/4  DP pulses B/L.  Unable to palpate PT pulses bilaterally secondary to edema.  CFT brisk all digits.  Skin temperature is warm to warm proximal distal B/L.  (+)hair growth B/L.   LE edema consistent with lymphedema L>R.  No weeping, no bulla noted B/L.    NEURO: Vibratory diminished 1st MPJ B/L.  Light touch intact B/L.   5.07 [place] monofilament intact B/L.    DERM:Nails >6 are thickened, discolored, crumbly, painful and elongated with subungual debris. Pain on palpation to the nails. No cellulitis noted.  No ulcers noted B/L.     MUSCULOSKEL: +5/5 muscle strength B/L.    Decreased ankle joint ROM B/L.  Pes planus noted B/L  Fat pad atrophy noted B/L  No pain with lateral compression of calcaneus  Mild pain upon palpation of the medial tubercle of the calcaneus   Windlass mechanism is intact B/L    Lab Results   Component Value Date    HGBA1C 6.3 (A) 07/12/2023      Assessment and Plan  #1  Planter fasciitis of left heel  Patient to continue in supportive shoe gear, stretch daily, ice as needed  OTC Voltaren gel every 6 hours as needed  Can also add capsaicin topically for treatment of the nocturnal pain  Follow-up 2 months     #2 DM with peripheral  neuropathy  Continue to control glucose  Continue to avoid barefoot walking  Rx: Diabetic shoes and inserts   Follow-up 2 months    #3 Onychomycosis  Debrided all nails in length and thickness  Follow-up 2 months

## 2024-11-27 ENCOUNTER — APPOINTMENT (OUTPATIENT)
Dept: PODIATRY | Facility: CLINIC | Age: 74
End: 2024-11-27
Payer: MEDICARE

## 2024-12-12 DIAGNOSIS — B37.0 ORAL THRUSH: ICD-10-CM

## 2024-12-12 RX ORDER — NYSTATIN 100000 [USP'U]/ML
5 SUSPENSION ORAL 3 TIMES DAILY
Qty: 210 ML | Refills: 1 | Status: SHIPPED | OUTPATIENT
Start: 2024-12-12 | End: 2025-01-09

## 2024-12-12 NOTE — PROGRESS NOTES
received call from patient. She state she has an area on the roof of her mouth that is irritated. There was blood on her dentures when she removed them. It is painful.  Per Dr. Morris prescription submitted for nystatin.  Patient instructed to swish and spit tid and brush her dentures with it as well.  Appointment arranged for next Friday for evaluation.

## 2024-12-20 ENCOUNTER — APPOINTMENT (OUTPATIENT)
Dept: OTOLARYNGOLOGY | Facility: CLINIC | Age: 74
End: 2024-12-20
Payer: MEDICARE

## 2024-12-20 VITALS — HEIGHT: 64 IN | BODY MASS INDEX: 29.88 KG/M2 | WEIGHT: 175 LBS

## 2024-12-20 DIAGNOSIS — B37.0 ORAL THRUSH: ICD-10-CM

## 2024-12-20 DIAGNOSIS — K13.79 LESION OF PALATE: ICD-10-CM

## 2024-12-20 PROCEDURE — 87102 FUNGUS ISOLATION CULTURE: CPT

## 2024-12-20 PROCEDURE — 4010F ACE/ARB THERAPY RXD/TAKEN: CPT | Performed by: OTOLARYNGOLOGY

## 2024-12-20 PROCEDURE — 3008F BODY MASS INDEX DOCD: CPT | Performed by: OTOLARYNGOLOGY

## 2024-12-20 PROCEDURE — 1159F MED LIST DOCD IN RCRD: CPT | Performed by: OTOLARYNGOLOGY

## 2024-12-20 PROCEDURE — 1160F RVW MEDS BY RX/DR IN RCRD: CPT | Performed by: OTOLARYNGOLOGY

## 2024-12-20 PROCEDURE — 99213 OFFICE O/P EST LOW 20 MIN: CPT | Performed by: OTOLARYNGOLOGY

## 2024-12-20 RX ORDER — TRIAMCINOLONE ACETONIDE 1 MG/G
PASTE DENTAL 2 TIMES DAILY
Qty: 5 G | Refills: 3 | Status: SHIPPED | OUTPATIENT
Start: 2024-12-20 | End: 2025-01-03

## 2024-12-22 LAB — FUNGUS SPEC FUNGUS STN: NORMAL

## 2024-12-26 PROBLEM — K13.79 LESION OF PALATE: Status: ACTIVE | Noted: 2024-12-26

## 2024-12-26 PROBLEM — B37.0 ORAL THRUSH: Status: ACTIVE | Noted: 2024-12-26

## 2024-12-26 NOTE — PROGRESS NOTES
74 yo woman who had a right lateral tongue cancer resected by Dr. Emmanuel in 2015, reconstructed by Dr. Morris with a Left RFFF.         Here for follow-up on burning mouth    Denture is painful and the roof of her mouth          Physical Exam:  CONSTITUTIONAL:  No acute distress  VOICE:  No hoarseness or other abnormality  RESPIRATION:  Breathing comfortably, no stridor  CV:  No clubbing/cyanosis/edema in hands  EYES:  EOM intact, sclera normal  NEURO:  Alert and oriented times 3, Cranial nerves II-XII grossly intact and symmetric bilaterally  HEAD AND FACE:  Symmetric facial features, no masses or lesions, sinuses non-tender to palpation  SALIVARY GLANDS:  Parotid and submandibular glands normal bilaterally  EARS:  Normal external ears, external auditory canals, and TMs to otoscopy, normal hearing to whispered voice.  NOSE:  External nose midline, anterior rhinoscopy is normal with limited visualization to the anterior aspect of the interior turbinates, no bleeding or drainage, no lesions  ORAL CAVITY/OROPHARYNX/LIPS:  Normal mucous membranes, normal floor of mouth/tongue/OP, nicely reconstructed right tongue, thrush noted throughout the gingiva, thrush evident on roof of mouth under denture, with erythema erythroplasia type changes consistent with thrush under her denture   PHARYNGEAL WALLS:  No masses or lesions  NECK/LYMPH:  No LAD, no thyroid masses, trachea midline  SKIN: Nicely healed skin incision     PSYCH:  Alert and oriented with appropriate mood and affect    Donor site okay      Oral thrush    Nystatin and Diflucan and instructions to soak her dentures and brush her teeth with the nystatin I have not given    She will let us know how she does    Reassurance provided this does not appear to be neoplastic

## 2024-12-27 LAB
FUNGUS SPEC CULT: NORMAL
FUNGUS SPEC FUNGUS STN: NORMAL

## 2024-12-30 LAB
FUNGUS SPEC CULT: NORMAL
FUNGUS SPEC FUNGUS STN: NORMAL

## 2025-01-06 LAB
FUNGUS SPEC CULT: NORMAL
FUNGUS SPEC FUNGUS STN: NORMAL

## 2025-02-05 ENCOUNTER — APPOINTMENT (OUTPATIENT)
Dept: PODIATRY | Facility: CLINIC | Age: 75
End: 2025-02-05
Payer: MEDICARE

## 2025-02-17 ENCOUNTER — TELEPHONE (OUTPATIENT)
Dept: PRIMARY CARE | Facility: CLINIC | Age: 75
End: 2025-02-17
Payer: MEDICARE

## 2025-02-17 NOTE — TELEPHONE ENCOUNTER
Called to schedule annual physical, sounded like a machine answered, and I think I left a VM but I am not sure.

## 2025-03-17 ENCOUNTER — TELEPHONE (OUTPATIENT)
Dept: PAIN MEDICINE | Facility: CLINIC | Age: 75
End: 2025-03-17
Payer: MEDICARE

## 2025-04-09 ENCOUNTER — OFFICE VISIT (OUTPATIENT)
Dept: PODIATRY | Facility: CLINIC | Age: 75
End: 2025-04-09
Payer: MEDICARE

## 2025-04-09 DIAGNOSIS — B35.1 ONYCHOMYCOSIS: Primary | ICD-10-CM

## 2025-04-09 DIAGNOSIS — E11.49 TYPE II DIABETES MELLITUS WITH NEUROLOGICAL MANIFESTATIONS (MULTI): ICD-10-CM

## 2025-04-09 PROCEDURE — 11721 DEBRIDE NAIL 6 OR MORE: CPT | Mod: Q8 | Performed by: PODIATRIST

## 2025-04-09 PROCEDURE — 1160F RVW MEDS BY RX/DR IN RCRD: CPT | Performed by: PODIATRIST

## 2025-04-09 PROCEDURE — 4010F ACE/ARB THERAPY RXD/TAKEN: CPT | Performed by: PODIATRIST

## 2025-04-09 PROCEDURE — 1159F MED LIST DOCD IN RCRD: CPT | Performed by: PODIATRIST

## 2025-04-09 PROCEDURE — 11721 DEBRIDE NAIL 6 OR MORE: CPT | Performed by: PODIATRIST

## 2025-04-09 RX ORDER — TRIAMCINOLONE ACETONIDE 1 MG/G
PASTE DENTAL
COMMUNITY
Start: 2025-01-09

## 2025-04-09 RX ORDER — NYSTATIN 100000 U/G
CREAM TOPICAL
COMMUNITY
Start: 2025-02-24

## 2025-04-09 NOTE — PROGRESS NOTES
Chief Complaint   Patient presents with    Follow-up     NAIL CARE     Chief Complaint   Patient presents with    Follow-up     NAIL CARE   HPI:  C/O painful elongated toenails that limit walking.  Patient is requesting debridement she is unable to reach her nails and trim them herself.  Glucose is controlled.      Physical Exam  Patient alert, oriented, no acute distress  Respiratory: non labored breathing  Psychiatric: Mood and affect normal/baseline  HEENT: sclera clear    VASC: +2/4  DP pulses B/L.  Unable to palpate PT pulses bilaterally secondary to edema.  CFT brisk all digits.  Skin temperature is warm to warm proximal to distal B/L.  (+)hair growth B/L.   LE edema consistent with lymphedema L>R.  No weeping, no bulla noted B/L.  No calor noted.    NEURO: Vibratory diminished 1st MPJ B/L.  Light touch intact B/L.   5.07 [place] monofilament intact B/L.    DERM:Nails 1,2,3,4,5 R and 1,2,3,4, 5 L are thickened, discolored, crumbly, painful and elongated with subungual debris. Pain on palpation to the nails. No cellulitis noted.  No ulcers noted B/L.  No acute paronychias noted.     MUSCULOSKEL: +5/5 muscle strength B/L.    Decreased ankle joint ROM B/L.  Pes planus noted B/L  Fat pad atrophy noted B/L    No recent HA1c in system    Class A Findings (1 needed)   Non-traumatic amputation of foot or integral skeleton portion thereof no    Class B Findings (2 needed)   Absent posterior tibial pulse yes   Absent dorsalis pedis pulse no   Advanced trophic changes; three of the following are required:     hair decrease or absent   yes     nail thickening   yes     pigmentary changes (discoloration)   yes     skin texture (thin, shiny)   no     skin color (rubor or redness)   yes    Class C Findings (1 Class B, 2 Class C needed)   Claudication   no   Temperature changes   no   Edema   yes   Paresthesia   yes   Burning   yes    Assessment and Plan  #1  Onychomycosis   Reviewed findings and footcare   Debrided  all nails in length and thickness without complications   Follow-up 2-3 months   Q8     #2 DM with peripheral neuropathy  Continue to control glucose  Continue to avoid barefoot walking  Follow-up 2-3 months

## 2025-05-08 ENCOUNTER — OFFICE VISIT (OUTPATIENT)
Dept: PAIN MEDICINE | Facility: CLINIC | Age: 75
End: 2025-05-08
Payer: MEDICARE

## 2025-05-08 VITALS
BODY MASS INDEX: 27.31 KG/M2 | DIASTOLIC BLOOD PRESSURE: 72 MMHG | WEIGHT: 160 LBS | HEART RATE: 53 BPM | OXYGEN SATURATION: 97 % | RESPIRATION RATE: 10 BRPM | SYSTOLIC BLOOD PRESSURE: 124 MMHG | HEIGHT: 64 IN | TEMPERATURE: 96.8 F

## 2025-05-08 DIAGNOSIS — N18.32 CHRONIC KIDNEY DISEASE, STAGE 3B (MULTI): ICD-10-CM

## 2025-05-08 DIAGNOSIS — C06.9 MALIGNANT NEOPLASM OF MOUTH, UNSPECIFIED: ICD-10-CM

## 2025-05-08 DIAGNOSIS — F03.A0 MILD DEMENTIA WITHOUT BEHAVIORAL DISTURBANCE, PSYCHOTIC DISTURBANCE, MOOD DISTURBANCE, OR ANXIETY, UNSPECIFIED DEMENTIA TYPE: ICD-10-CM

## 2025-05-08 DIAGNOSIS — C02.9 MALIGNANT NEOPLASM OF TONGUE (MULTI): ICD-10-CM

## 2025-05-08 DIAGNOSIS — M47.22 CERVICAL SPONDYLOSIS WITH RADICULOPATHY: ICD-10-CM

## 2025-05-08 DIAGNOSIS — M17.10 PRIMARY OSTEOARTHRITIS OF KNEE, UNSPECIFIED LATERALITY: Primary | ICD-10-CM

## 2025-05-08 PROBLEM — R53.83 FATIGUE: Status: ACTIVE | Noted: 2025-05-08

## 2025-05-08 PROBLEM — Z85.9 HISTORY OF MALIGNANT NEOPLASM: Status: ACTIVE | Noted: 2025-05-08

## 2025-05-08 PROBLEM — R68.89 SUSPECTED MALIGNANT NEOPLASM: Status: ACTIVE | Noted: 2025-05-08

## 2025-05-08 PROBLEM — R60.9 DEPENDENT EDEMA: Status: ACTIVE | Noted: 2025-05-08

## 2025-05-08 PROBLEM — Z86.39 HISTORY OF DIABETES MELLITUS: Status: ACTIVE | Noted: 2025-05-08

## 2025-05-08 PROBLEM — R06.83 SNORING: Status: ACTIVE | Noted: 2025-05-08

## 2025-05-08 PROBLEM — Z86.69 HISTORY OF CATARACT: Status: ACTIVE | Noted: 2025-05-08

## 2025-05-08 PROBLEM — Z86.59 HISTORY OF DEPRESSION: Status: ACTIVE | Noted: 2025-05-08

## 2025-05-08 PROBLEM — T14.8XXA EXCORIATION: Status: ACTIVE | Noted: 2025-05-08

## 2025-05-08 PROBLEM — R27.9: Status: ACTIVE | Noted: 2025-05-08

## 2025-05-08 PROBLEM — K92.2 UPPER GASTROINTESTINAL HEMORRHAGE: Status: ACTIVE | Noted: 2025-05-08

## 2025-05-08 PROBLEM — H61.20 IMPACTED CERUMEN: Status: ACTIVE | Noted: 2025-05-08

## 2025-05-08 PROBLEM — G47.9 DIFFICULTY SLEEPING: Status: ACTIVE | Noted: 2025-05-08

## 2025-05-08 PROBLEM — E11.9 DIABETES MELLITUS (MULTI): Status: ACTIVE | Noted: 2025-05-08

## 2025-05-08 PROBLEM — H91.90 HEARING LOSS: Status: ACTIVE | Noted: 2025-05-08

## 2025-05-08 PROBLEM — R06.09 DYSPNEA ON EXERTION: Status: ACTIVE | Noted: 2025-05-08

## 2025-05-08 PROBLEM — Z86.2 HISTORY OF ANEMIA: Status: ACTIVE | Noted: 2025-05-08

## 2025-05-08 PROBLEM — Z86.79 HISTORY OF HYPERTENSION: Status: ACTIVE | Noted: 2025-05-08

## 2025-05-08 PROCEDURE — 99214 OFFICE O/P EST MOD 30 MIN: CPT | Performed by: ANESTHESIOLOGY

## 2025-05-08 PROCEDURE — 3078F DIAST BP <80 MM HG: CPT | Performed by: ANESTHESIOLOGY

## 2025-05-08 PROCEDURE — 3074F SYST BP LT 130 MM HG: CPT | Performed by: ANESTHESIOLOGY

## 2025-05-08 PROCEDURE — 3008F BODY MASS INDEX DOCD: CPT | Performed by: ANESTHESIOLOGY

## 2025-05-08 PROCEDURE — 1126F AMNT PAIN NOTED NONE PRSNT: CPT | Performed by: ANESTHESIOLOGY

## 2025-05-08 PROCEDURE — 4010F ACE/ARB THERAPY RXD/TAKEN: CPT | Performed by: ANESTHESIOLOGY

## 2025-05-08 SDOH — ECONOMIC STABILITY: FOOD INSECURITY: WITHIN THE PAST 12 MONTHS, YOU WORRIED THAT YOUR FOOD WOULD RUN OUT BEFORE YOU GOT MONEY TO BUY MORE.: NEVER TRUE

## 2025-05-08 SDOH — ECONOMIC STABILITY: FOOD INSECURITY: WITHIN THE PAST 12 MONTHS, THE FOOD YOU BOUGHT JUST DIDN'T LAST AND YOU DIDN'T HAVE MONEY TO GET MORE.: NEVER TRUE

## 2025-05-08 ASSESSMENT — PATIENT HEALTH QUESTIONNAIRE - PHQ9
2. FEELING DOWN, DEPRESSED OR HOPELESS: NOT AT ALL
SUM OF ALL RESPONSES TO PHQ9 QUESTIONS 1 & 2: 0
1. LITTLE INTEREST OR PLEASURE IN DOING THINGS: NOT AT ALL

## 2025-05-08 ASSESSMENT — ANXIETY QUESTIONNAIRES
GAD7 TOTAL SCORE: 0
3. WORRYING TOO MUCH ABOUT DIFFERENT THINGS: NOT AT ALL
6. BECOMING EASILY ANNOYED OR IRRITABLE: NOT AT ALL
2. NOT BEING ABLE TO STOP OR CONTROL WORRYING: NOT AT ALL
1. FEELING NERVOUS, ANXIOUS, OR ON EDGE: NOT AT ALL
IF YOU CHECKED OFF ANY PROBLEMS ON THIS QUESTIONNAIRE, HOW DIFFICULT HAVE THESE PROBLEMS MADE IT FOR YOU TO DO YOUR WORK, TAKE CARE OF THINGS AT HOME, OR GET ALONG WITH OTHER PEOPLE: NOT DIFFICULT AT ALL
7. FEELING AFRAID AS IF SOMETHING AWFUL MIGHT HAPPEN: NOT AT ALL
4. TROUBLE RELAXING: NOT AT ALL
5. BEING SO RESTLESS THAT IT IS HARD TO SIT STILL: NOT AT ALL

## 2025-05-08 ASSESSMENT — LIFESTYLE VARIABLES
SKIP TO QUESTIONS 9-10: 1
HOW MANY STANDARD DRINKS CONTAINING ALCOHOL DO YOU HAVE ON A TYPICAL DAY: PATIENT DOES NOT DRINK
AUDIT-C TOTAL SCORE: 0
HOW OFTEN DO YOU HAVE SIX OR MORE DRINKS ON ONE OCCASION: NEVER
HOW OFTEN DO YOU HAVE A DRINK CONTAINING ALCOHOL: NEVER

## 2025-05-08 ASSESSMENT — ENCOUNTER SYMPTOMS
DEPRESSION: 0
LOSS OF SENSATION IN FEET: 0
OCCASIONAL FEELINGS OF UNSTEADINESS: 1

## 2025-05-08 ASSESSMENT — PAIN - FUNCTIONAL ASSESSMENT: PAIN_FUNCTIONAL_ASSESSMENT: NO/DENIES PAIN

## 2025-05-08 ASSESSMENT — PAIN SCALES - GENERAL: PAINLEVEL_OUTOF10: 0-NO PAIN

## 2025-05-08 NOTE — H&P (VIEW-ONLY)
SUBJECTIVE:  This is 74 y.o.  female with PMH of obesity BMI 35, diabetes, hypertension, diabetes mellitus, chronic lymphedema of legs, arthritis, peptic ulcer disease, carcinoma of uterus s/p JACQUELINE/BSO, history of carcinoma of tongue s/p partial glossectomy, PUD, on tramadol x 4/day, presenting with advanced knee arthritis with multiple arthritis in her body in addition to massive lymphedema in the left leg to a mild lymphedema in the right leg who takes 4 tramadol per day and her nurse practitioner does not prescribe tramadol and her primary physician retired and she is here to continue tramadol therapy.  We took over her tramadol therapy and she received bilateral knee Depo-Medrol 80 mg injection and we recommended cervical EVE of her neck pain does not respond to tramadol who is here for follow-up stated that the knee injections has helped significantly for more than 100% in the beginning but unfortunately did not last and she feels her pain is really now increasing significantly.  Will plan on bilateral knee Zilretta injections hopefully that will last her longer.  In addition she is complaining of significant neck pain associated with radiculopathy to her right arm more than her left and we will plan on the cervical epidural steroid injection after the knee injections.  The patient is taking her tramadol without any side effects and she feels comfortable with the.      Prior office visit:  2/15/2024: The patient was referred to us by Referring Provider: Mariah Mcdonald, ARTUR-CNP chronic bilateral knee pain. this is 73 y.o.  female with a past history of obesity BMI 35, diabetes, hypertension, diabetes mellitus, chronic lymphedema of legs, arthritis, peptic ulcer disease, carcinoma of uterus s/p JACQUELINE/BSO, history of carcinoma of tongue s/p partial glossectomy, PUD, on tramadol x 4/day, presenting with advanced knee arthritis with multiple arthritis in her body in addition to massive lymphedema in the left leg to a  mild lymphedema in the right leg who takes 4 tramadol per day and her nurse practitioner does not prescribe tramadol and her primary physician retired and she is here to continue tramadol therapy.  Assessment  73 years old with significant history of multiple osteoarthritis in addition to cervical spondylosis with left arm radiculopathy and left leg large lymphedema and mild to moderate right leg lymphedema takes tramadol 4 tablets a day from her retired PCP and now her nurse practitioner does not prescribe tramadol and sent her to us.  We will take over her tramadol therapy and will order x-rays for her cervical and her knees.  Most likely she will need knee injections in addition to cervical epidural steroid injection to help with the spondylosis and left arm radiculopathy.  The patient also I think will be a candidate for Zilretta injections.  Agreement and urine screen done today       Plan  Pool therapy, walking in the pool, at least 3x per week for 30 minutes  Cervical x-ray ordered degenerative changes in the cervical spine with osteophyte and bridging at C6-7  Bilateral knee x-rays standing ordered showed end-stage bilateral knee arthritis  Continue tramadol 1 p.o. 4 times daily to take with acetaminophen 500 mg  Consider left C6-7 interlaminar EVE when patient calls  Consider bilateral knee Zilretta injections when patient calls  Healthy lifestyle and anti-inflammatory diet in addition to weight control discussed with the patient  Alternative chronic pain therapies was discussed, encouraged and information was handed  Return to Clinic 3 months        Procedures:   3/4/2024 bilateral knee Depo-Medrol 80 mg injection patient has had 100% improvement in pain function for couple weeks then the pain started coming back     Portions of record reviewed for pertinent issues: active problem list, medication list, allergies, family history, social history, notes from last encounter, encounters, lab results, imaging  and other available records.     I have personally reviewed the [place] report for this patient. This report is scanned into the electronic medical record. I have considered the risks of abuse, dependence, addiction and diversion. It showed: Tramadol 4 times daily from Mariah Mcdonald  OPIOID RISK ASSESSMENT SCORE 0/26  Opioid agreement: 2/16/2024  Activities of daily living: Active for her age and condition  Adverse effects: None  Analgesia: W/O 8/10, W 4/10 2 tramadol's in the morning and 2 tramadol's in the evening  Toxicology screen: 2/16/2024  Aberrant behavior: None  Patient is being treated with tramadol therapy for pain and is responding appropriately.  There are NO signs of opioid intoxication, abuse, addiction or withdrawal.  Pupils are equal, reactive to light bilateral, appropriate speech and cognition. Patient denies any opioid induced constipation. The [place] registry followed periodically, urine toxicology completed and appropriate.      Patient is advised and warned  in specific detail about potential benefits of opioids along with risks and side effects including, but not limited to, dependency, addiction, tolerance, hyperalgesia, anxiety, depression, insomnia, endocrine changes, immunologic disturbances, respiratory depression and death.      Caution advised regarding the use of medications prescribed at this office and specific mention made regarding not to drive or operate heavy machinery if feeling side effects from this the medications. Patient  expressed an understanding in regards to these particular concerns.      Discussed non-opioid options including (But no limited), physical wellness, antiinflammatory diet, icing and heating, meditation, relaxation, massage and acupuncture.     We will continue to monitor and adjust medications as needed.           Diagnostic studies:  9/23/2023 DEXA scan did not show any major osteoporosis  12/21/2020 C-spine x-ray showed degenerative changes some spondylosis  and anterolisthesis C4-C6     Employment/disability/litigation: Retired retail and market research     Social History:  has 2 children and 3 grandchildren denies smoking drinking or use of illicit drugs        Review of Systems   HENT: Negative.     Eyes: Negative.    Respiratory: Negative.     Cardiovascular: Negative.    Gastrointestinal: Negative.    Endocrine: Negative.    Genitourinary: Negative.    Musculoskeletal:  Positive for arthralgias, back pain, myalgias and neck pain.   Skin: Negative.    Neurological: Negative.    Hematological: Negative.    Psychiatric/Behavioral: Negative.              Physical Exam  Vitals and nursing note reviewed.   Constitutional:       Appearance: Normal appearance.   HENT:      Head: Normocephalic and atraumatic.      Nose: Nose normal.   Eyes:      Extraocular Movements: Extraocular movements intact.      Conjunctiva/sclera: Conjunctivae normal.      Pupils: Pupils are equal, round, and reactive to light.   Cardiovascular:      Rate and Rhythm: Normal rate and regular rhythm.      Pulses: Normal pulses.      Heart sounds: Normal heart sounds.   Pulmonary:      Effort: Pulmonary effort is normal.      Breath sounds: Normal breath sounds.   Abdominal:      General: Abdomen is flat. Bowel sounds are normal.      Palpations: Abdomen is soft.   Musculoskeletal:         General: Tenderness present.      Comments: Large lymphedema in the left leg mild to moderate lymphedema in the right leg tenderness the cervical spine with decreased range of motion, deformed joints in the hands   Skin:     General: Skin is warm.   Neurological:      General: No focal deficit present.      Mental Status: She is alert and oriented to person, place, and time.  Lhermitte's - Spurling's + on the right more than left  Psychiatric:         Mood and Affect: Mood normal.         Behavior: Behavior normal.               Plan  At least 50% of the visit was involved in the discussion of the options for  treatment. We discussed exercises, medication, interventional therapies and surgery. Healthy life style is essential with patient hard work to achieve the wellness. In addition; discussion with the patient and/or family about any of the diagnostic results, impressions and/or recommended diagnostic studies, prognosis, risks and benefits of treatment options, instructions for treatment and/or follow-up, importance of compliance with chosen treatment options, risk-factor reduction, and patient/family education.         Continue self-directed physical therapy at least daily exercises for minimum of 20-minute  Continue tramadol 4 times daily  Bilateral knee Zilretta injection  Right or left C6-7 interlaminar EVE after the knee injections  Healthy lifestyle and anti-inflammatory diet in addition to weight control discussed with the patient  Alternative chronic pain therapies was discussed, encouraged and information was handed  Return to Clinic 3 to 6 months     *Please note this report has been produced using speech recognition software and may contain errors related to that system including grammar, punctuation and spelling as well as words and phrases that may be inappropriate. If there are questions or concerns, please feel free to contact me to clarify.    Ced Yen MD

## 2025-05-08 NOTE — PROGRESS NOTES
This is 74 female with who has been treated for Cervical pain  and both legs. Pain is unchanged, The pain is described as achiness and constant and is relieved by Sitting and Medications tramadol and tylenol with who is here for follow-up on both leg pain and LT hand numbness that just started. Pain is 0 when sitting and 5 when moving around    Pain Therapies: chronic opioid therapy tramadol, physical Therapy , and Injections       Rob Each Box that Applies Female Male   FAMILY HISTORY OF SUBSTANCE ABUSE  Rob the boxes that applies   Alcohol ?  1    ? 3   Illegal drugs ?  2 ? 3   Rx drugs ?  4 ? 4   PERSONAL HISTORY OF SUBSTNACE ABUSE   Alcohol ?  3 ?  3   Illegal drugs ?  4 ?  4    Rx drugs ?  5 ?  5   Age Between 16-45 years ?  1 ?  1   History of Preadolescent Sexual Abuse ?  3 ?  0   PSYCHOLOGIC DISEASE   ADD, OCD, bipolar, schizophrenia ?  2 ?  2   Depression ?  1 ?  1   Scoring Totals       Scoring (Risk)  0-3 - Low  4-7 - Moderate  8 - High    Opioid Risk Assessment Score 0/26

## 2025-05-12 ENCOUNTER — TELEPHONE (OUTPATIENT)
Dept: PAIN MEDICINE | Facility: CLINIC | Age: 75
End: 2025-05-12
Payer: MEDICARE

## 2025-05-12 DIAGNOSIS — M17.0 BILATERAL PRIMARY OSTEOARTHRITIS OF KNEE: Primary | ICD-10-CM

## 2025-05-12 NOTE — TELEPHONE ENCOUNTER
----- Message from Nella RIVERA sent at 5/12/2025  1:52 PM EDT -----  Regarding: need zilretta order  Pt scheduled for b/l Zilretta on 5/20/25. Can you please enter order for pharmacy. thx

## 2025-05-20 ENCOUNTER — HOSPITAL ENCOUNTER (OUTPATIENT)
Dept: PAIN MEDICINE | Facility: CLINIC | Age: 75
Discharge: HOME | End: 2025-05-20
Payer: MEDICARE

## 2025-05-20 VITALS
HEART RATE: 69 BPM | OXYGEN SATURATION: 100 % | SYSTOLIC BLOOD PRESSURE: 130 MMHG | HEIGHT: 64 IN | WEIGHT: 160 LBS | RESPIRATION RATE: 16 BRPM | TEMPERATURE: 98.4 F | BODY MASS INDEX: 27.31 KG/M2 | DIASTOLIC BLOOD PRESSURE: 64 MMHG

## 2025-05-20 DIAGNOSIS — M17.10 PRIMARY OSTEOARTHRITIS OF KNEE, UNSPECIFIED LATERALITY: ICD-10-CM

## 2025-05-20 PROCEDURE — 20610 DRAIN/INJ JOINT/BURSA W/O US: CPT | Mod: 50 | Performed by: ANESTHESIOLOGY

## 2025-05-20 PROCEDURE — A9575 INJ GADOTERATE MEGLUMI 0.1ML: HCPCS | Performed by: ANESTHESIOLOGY

## 2025-05-20 PROCEDURE — 2550000001 HC RX 255 CONTRASTS: Performed by: ANESTHESIOLOGY

## 2025-05-20 PROCEDURE — 2500000004 HC RX 250 GENERAL PHARMACY W/ HCPCS (ALT 636 FOR OP/ED): Performed by: ANESTHESIOLOGY

## 2025-05-20 PROCEDURE — 77002 NEEDLE LOCALIZATION BY XRAY: CPT | Performed by: ANESTHESIOLOGY

## 2025-05-20 RX ORDER — LIDOCAINE HYDROCHLORIDE AND EPINEPHRINE 10; 10 UG/ML; MG/ML
INJECTION, SOLUTION INFILTRATION; PERINEURAL AS NEEDED
Status: COMPLETED | OUTPATIENT
Start: 2025-05-20 | End: 2025-05-20

## 2025-05-20 RX ORDER — BUPIVACAINE HYDROCHLORIDE 7.5 MG/ML
INJECTION, SOLUTION EPIDURAL; RETROBULBAR AS NEEDED
Status: COMPLETED | OUTPATIENT
Start: 2025-05-20 | End: 2025-05-20

## 2025-05-20 RX ORDER — GADOTERATE MEGLUMINE 376.9 MG/ML
INJECTION INTRAVENOUS AS NEEDED
Status: COMPLETED | OUTPATIENT
Start: 2025-05-20 | End: 2025-05-20

## 2025-05-20 RX ADMIN — GADOTERATE MEGLUMINE 3 ML: 376.9 INJECTION INTRAVENOUS at 15:17

## 2025-05-20 RX ADMIN — BUPIVACAINE HYDROCHLORIDE 10 ML: 7.5 INJECTION, SOLUTION EPIDURAL; RETROBULBAR at 15:09

## 2025-05-20 RX ADMIN — LIDOCAINE HYDROCHLORIDE,EPINEPHRINE BITARTRATE 10 ML: 10; .01 INJECTION, SOLUTION INFILTRATION; PERINEURAL at 15:08

## 2025-05-20 RX ADMIN — GADOTERATE MEGLUMINE 3 ML: 376.9 INJECTION INTRAVENOUS at 15:09

## 2025-05-20 RX ADMIN — TRIAMCINOLONE ACETONIDE EXTENDED-RELEASE INJECTABLE SUSPENSION 64 MG: KIT INTRA-ARTICULAR at 15:09

## 2025-05-20 ASSESSMENT — PAIN SCALES - GENERAL
PAINLEVEL_OUTOF10: 2
PAINLEVEL_OUTOF10: 5 - MODERATE PAIN

## 2025-05-20 ASSESSMENT — PAIN - FUNCTIONAL ASSESSMENT: PAIN_FUNCTIONAL_ASSESSMENT: 0-10

## 2025-06-18 ENCOUNTER — APPOINTMENT (OUTPATIENT)
Dept: PODIATRY | Facility: CLINIC | Age: 75
End: 2025-06-18
Payer: MEDICARE

## 2025-07-01 ENCOUNTER — APPOINTMENT (OUTPATIENT)
Dept: PAIN MEDICINE | Facility: CLINIC | Age: 75
End: 2025-07-01
Payer: MEDICARE

## 2025-07-10 ENCOUNTER — APPOINTMENT (OUTPATIENT)
Dept: PODIATRY | Facility: CLINIC | Age: 75
End: 2025-07-10
Payer: MEDICARE

## 2025-08-12 DIAGNOSIS — D64.9 ANEMIA, UNSPECIFIED TYPE: ICD-10-CM

## 2025-08-12 DIAGNOSIS — E53.8 VITAMIN B12 DEFICIENCY: ICD-10-CM

## 2025-08-12 DIAGNOSIS — D69.6 THROMBOCYTOPENIA: ICD-10-CM

## 2025-08-12 RX ORDER — ZINC GLUCONATE 50 MG
1000 TABLET ORAL DAILY
Qty: 30 TABLET | Refills: 0 | Status: SHIPPED | OUTPATIENT
Start: 2025-08-12